# Patient Record
Sex: FEMALE | Race: WHITE | NOT HISPANIC OR LATINO | URBAN - METROPOLITAN AREA
[De-identification: names, ages, dates, MRNs, and addresses within clinical notes are randomized per-mention and may not be internally consistent; named-entity substitution may affect disease eponyms.]

---

## 2017-04-07 ENCOUNTER — EMERGENCY (EMERGENCY)
Facility: HOSPITAL | Age: 82
LOS: 1 days | Discharge: HOME | End: 2017-04-07
Attending: EMERGENCY MEDICINE | Admitting: EMERGENCY MEDICINE
Payer: MEDICARE

## 2017-04-07 VITALS
SYSTOLIC BLOOD PRESSURE: 189 MMHG | HEART RATE: 53 BPM | RESPIRATION RATE: 18 BRPM | TEMPERATURE: 98 F | OXYGEN SATURATION: 98 % | DIASTOLIC BLOOD PRESSURE: 91 MMHG

## 2017-04-07 PROBLEM — Z00.00 ENCOUNTER FOR PREVENTIVE HEALTH EXAMINATION: Status: ACTIVE | Noted: 2017-04-07

## 2017-04-07 PROCEDURE — 93010 ELECTROCARDIOGRAM REPORT: CPT

## 2017-04-07 PROCEDURE — 99284 EMERGENCY DEPT VISIT MOD MDM: CPT | Mod: 25

## 2017-04-07 RX ORDER — AMLODIPINE BESYLATE 2.5 MG/1
2.5 TABLET ORAL ONCE
Qty: 0 | Refills: 0 | Status: COMPLETED | OUTPATIENT
Start: 2017-04-07 | End: 2017-04-07

## 2017-04-07 RX ORDER — AMLODIPINE BESYLATE 2.5 MG/1
5 TABLET ORAL ONCE
Qty: 0 | Refills: 0 | Status: DISCONTINUED | OUTPATIENT
Start: 2017-04-07 | End: 2017-04-07

## 2017-04-07 RX ADMIN — AMLODIPINE BESYLATE 2.5 MILLIGRAM(S): 2.5 TABLET ORAL at 17:06

## 2017-04-07 NOTE — ED PROVIDER NOTE - ATTENDING CONTRIBUTION TO CARE
89f, pmhx paget's. was at pre surgical testing today and found to be htn at 200 SBP. was rx norvasc 2.5 andf was told to f/u with pmd or UC. went to  today who referred her to the ED. The patient denies any acute chest or back pain, shortness of breath or vomiting. The patient also denies weakness, numbness, parasthesias, visual changes or ataxia. The patient reports no decreased urination or abdominal pain.   the pt has a pmd and her appt was made for monday. however, she is not compliant with periodic physical exams and last had one 2 years ago. exam, bp 189/90, hr 53, other vs wnl. hs and lungs normal, abdo benign, CN II-XII normal, motor/sensation/tone normal, gait normal, drift neg, cerebellar signs normal. pt has asymptomatic htn. per pt request for treatment will give her the norvasc 2.5 that she was rx. rter for s/s htn emergency. had a long discussion on bp monitoring, the need to not lower bp acutely and the need for close f/u.

## 2017-04-07 NOTE — ED ADULT NURSE NOTE - OBJECTIVE STATEMENT
Patient coming in from urgent care. States was just recently prescribed amlodipine 2.5 and was not able to  the prescription yet. Went to urgent care and was told BP was high and to come to ER. Patient medicated as ordered. Vitally stable, in no acute distress.

## 2017-04-07 NOTE — ED PROVIDER NOTE - OBJECTIVE STATEMENT
85 yo F with history PAgets disease presenting with elevated blood pressure today to systolic of 200's. Pt has noc omplaints. Pt went for presurgical testing and found to have elevated bp. told to go to pmd or urgent care. PMD not available so went to urgent care. Denies fevers, chills, cough, rhinorrhea, otorrhea, otalgia, nausea, vomiting, constipation, diarrhea, chest pain, shortness of breath or changes in urinary habits. Pt states she went to PMD weeks ago and told bp normal but unaware how high.

## 2022-07-31 ENCOUNTER — INPATIENT (INPATIENT)
Facility: HOSPITAL | Age: 87
LOS: 3 days | Discharge: HOME | DRG: 436 | End: 2022-08-04
Attending: GENERAL PRACTICE | Admitting: GENERAL PRACTICE
Payer: MEDICARE

## 2022-07-31 VITALS
HEIGHT: 60 IN | TEMPERATURE: 98 F | RESPIRATION RATE: 20 BRPM | SYSTOLIC BLOOD PRESSURE: 218 MMHG | DIASTOLIC BLOOD PRESSURE: 88 MMHG | HEART RATE: 65 BPM | WEIGHT: 119.93 LBS | OXYGEN SATURATION: 99 %

## 2022-07-31 DIAGNOSIS — K86.89 OTHER SPECIFIED DISEASES OF PANCREAS: ICD-10-CM

## 2022-07-31 DIAGNOSIS — R60.0 LOCALIZED EDEMA: ICD-10-CM

## 2022-07-31 DIAGNOSIS — R00.1 BRADYCARDIA, UNSPECIFIED: ICD-10-CM

## 2022-07-31 DIAGNOSIS — K52.9 NONINFECTIVE GASTROENTERITIS AND COLITIS, UNSPECIFIED: ICD-10-CM

## 2022-07-31 LAB
ALBUMIN SERPL ELPH-MCNC: 4.5 G/DL — SIGNIFICANT CHANGE UP (ref 3.3–5)
ALP SERPL-CCNC: 114 U/L — SIGNIFICANT CHANGE UP (ref 40–120)
ALT FLD-CCNC: 11 U/L — SIGNIFICANT CHANGE UP (ref 10–45)
ANION GAP SERPL CALC-SCNC: 15 MMOL/L — SIGNIFICANT CHANGE UP (ref 5–17)
AST SERPL-CCNC: 33 U/L — SIGNIFICANT CHANGE UP (ref 10–40)
BASOPHILS # BLD AUTO: 0.02 K/UL — SIGNIFICANT CHANGE UP (ref 0–0.2)
BASOPHILS NFR BLD AUTO: 0.3 % — SIGNIFICANT CHANGE UP (ref 0–2)
BILIRUB SERPL-MCNC: 0.8 MG/DL — SIGNIFICANT CHANGE UP (ref 0.2–1.2)
BUN SERPL-MCNC: 12 MG/DL — SIGNIFICANT CHANGE UP (ref 7–23)
CALCIUM SERPL-MCNC: 9.6 MG/DL — SIGNIFICANT CHANGE UP (ref 8.4–10.5)
CHLORIDE SERPL-SCNC: 100 MMOL/L — SIGNIFICANT CHANGE UP (ref 96–108)
CO2 SERPL-SCNC: 23 MMOL/L — SIGNIFICANT CHANGE UP (ref 22–31)
CREAT SERPL-MCNC: 0.78 MG/DL — SIGNIFICANT CHANGE UP (ref 0.5–1.3)
EGFR: 73 ML/MIN/1.73M2 — SIGNIFICANT CHANGE UP
EOSINOPHIL # BLD AUTO: 0.04 K/UL — SIGNIFICANT CHANGE UP (ref 0–0.5)
EOSINOPHIL NFR BLD AUTO: 0.6 % — SIGNIFICANT CHANGE UP (ref 0–6)
GLUCOSE SERPL-MCNC: 108 MG/DL — HIGH (ref 70–99)
HCT VFR BLD CALC: 39.1 % — SIGNIFICANT CHANGE UP (ref 34.5–45)
HGB BLD-MCNC: 12.6 G/DL — SIGNIFICANT CHANGE UP (ref 11.5–15.5)
IMM GRANULOCYTES NFR BLD AUTO: 0.1 % — SIGNIFICANT CHANGE UP (ref 0–1.5)
LYMPHOCYTES # BLD AUTO: 1.65 K/UL — SIGNIFICANT CHANGE UP (ref 1–3.3)
LYMPHOCYTES # BLD AUTO: 23.1 % — SIGNIFICANT CHANGE UP (ref 13–44)
MCHC RBC-ENTMCNC: 31.8 PG — SIGNIFICANT CHANGE UP (ref 27–34)
MCHC RBC-ENTMCNC: 32.2 GM/DL — SIGNIFICANT CHANGE UP (ref 32–36)
MCV RBC AUTO: 98.7 FL — SIGNIFICANT CHANGE UP (ref 80–100)
MONOCYTES # BLD AUTO: 0.44 K/UL — SIGNIFICANT CHANGE UP (ref 0–0.9)
MONOCYTES NFR BLD AUTO: 6.2 % — SIGNIFICANT CHANGE UP (ref 2–14)
NEUTROPHILS # BLD AUTO: 4.97 K/UL — SIGNIFICANT CHANGE UP (ref 1.8–7.4)
NEUTROPHILS NFR BLD AUTO: 69.7 % — SIGNIFICANT CHANGE UP (ref 43–77)
NRBC # BLD: 0 /100 WBCS — SIGNIFICANT CHANGE UP (ref 0–0)
PLATELET # BLD AUTO: 158 K/UL — SIGNIFICANT CHANGE UP (ref 150–400)
POTASSIUM SERPL-MCNC: 5.3 MMOL/L — SIGNIFICANT CHANGE UP (ref 3.5–5.3)
POTASSIUM SERPL-SCNC: 5.3 MMOL/L — SIGNIFICANT CHANGE UP (ref 3.5–5.3)
PROT SERPL-MCNC: 8.2 G/DL — SIGNIFICANT CHANGE UP (ref 6–8.3)
RBC # BLD: 3.96 M/UL — SIGNIFICANT CHANGE UP (ref 3.8–5.2)
RBC # FLD: 13.3 % — SIGNIFICANT CHANGE UP (ref 10.3–14.5)
SARS-COV-2 RNA SPEC QL NAA+PROBE: SIGNIFICANT CHANGE UP
SODIUM SERPL-SCNC: 138 MMOL/L — SIGNIFICANT CHANGE UP (ref 135–145)
WBC # BLD: 7.13 K/UL — SIGNIFICANT CHANGE UP (ref 3.8–10.5)
WBC # FLD AUTO: 7.13 K/UL — SIGNIFICANT CHANGE UP (ref 3.8–10.5)

## 2022-07-31 PROCEDURE — 71250 CT THORAX DX C-: CPT | Mod: 26

## 2022-07-31 PROCEDURE — 74177 CT ABD & PELVIS W/CONTRAST: CPT | Mod: 26,MA

## 2022-07-31 PROCEDURE — 99285 EMERGENCY DEPT VISIT HI MDM: CPT

## 2022-07-31 RX ORDER — LANOLIN ALCOHOL/MO/W.PET/CERES
3 CREAM (GRAM) TOPICAL AT BEDTIME
Refills: 0 | Status: DISCONTINUED | OUTPATIENT
Start: 2022-07-31 | End: 2022-08-04

## 2022-07-31 RX ORDER — ONDANSETRON 8 MG/1
4 TABLET, FILM COATED ORAL EVERY 8 HOURS
Refills: 0 | Status: DISCONTINUED | OUTPATIENT
Start: 2022-07-31 | End: 2022-08-04

## 2022-07-31 RX ORDER — HEPARIN SODIUM 5000 [USP'U]/ML
5000 INJECTION INTRAVENOUS; SUBCUTANEOUS EVERY 12 HOURS
Refills: 0 | Status: DISCONTINUED | OUTPATIENT
Start: 2022-07-31 | End: 2022-08-01

## 2022-07-31 RX ORDER — SODIUM CHLORIDE 9 MG/ML
500 INJECTION INTRAMUSCULAR; INTRAVENOUS; SUBCUTANEOUS ONCE
Refills: 0 | Status: COMPLETED | OUTPATIENT
Start: 2022-07-31 | End: 2022-07-31

## 2022-07-31 RX ORDER — AMLODIPINE BESYLATE 2.5 MG/1
5 TABLET ORAL ONCE
Refills: 0 | Status: COMPLETED | OUTPATIENT
Start: 2022-07-31 | End: 2022-07-31

## 2022-07-31 RX ORDER — ACETAMINOPHEN 500 MG
650 TABLET ORAL EVERY 6 HOURS
Refills: 0 | Status: DISCONTINUED | OUTPATIENT
Start: 2022-07-31 | End: 2022-08-04

## 2022-07-31 RX ADMIN — SODIUM CHLORIDE 500 MILLILITER(S): 9 INJECTION INTRAMUSCULAR; INTRAVENOUS; SUBCUTANEOUS at 13:43

## 2022-07-31 RX ADMIN — AMLODIPINE BESYLATE 5 MILLIGRAM(S): 2.5 TABLET ORAL at 16:25

## 2022-07-31 NOTE — ED ADULT NURSE REASSESSMENT NOTE - NS ED NURSE REASSESS COMMENT FT1
Report received from EMERITA Guo. Patient resting in stretcher in red room 38L. A&Ox3. VSS. IV patent and flushes without difficulty. Denies any pain at this time.  at bedside, plan of care discussed with patient and family, verbalized understanding. Stretcher locked and in lowest position, appropriate side rails up. Pt instructed to notify RN if assistance is needed. Pending bed placement. Report received from EMERITA Guo. Patient resting in stretcher in red room 38L. A&Ox3. VSS. IV patent and flushes without difficulty. Denies any pain at this time. Family at bedside, plan of care discussed with patient and family, verbalized understanding. Stretcher locked and in lowest position, appropriate side rails up. Pt instructed to notify RN if assistance is needed. Pending bed placement.

## 2022-07-31 NOTE — H&P ADULT - PROBLEM SELECTOR PLAN 1
likely pancreatic cancer with mets  check cancer markers  CT as noted  check CT chest for complete workup  onc to arrange for biopsy if pt agreeable

## 2022-07-31 NOTE — ED PROVIDER NOTE - OBJECTIVE STATEMENT
89 year old female with a PMHx of anal cancer now resolved and no other stated medical history presenting with multiple medical problems. Patient states that she has had a bump under her left breast for the last 40 years. Over the last 2 weeks the lump has been growing. Painful at night. Associated diarrhea and loss of appetite over the last few days. Denies fevers, chest pain, difficulty breathing, abdominal pain, nausea, vomiting, changes in vision, headaches. Patient also has left lower extremity edema that she states has been present for the last 2 years and has been evaluated by her physician. She states she takes no medications.

## 2022-07-31 NOTE — ED PROVIDER NOTE - PHYSICAL EXAMINATION
gen: well appearing  Mentation: AAO x 3  psych: Anxious  HEENT: airway patent, conjunctivae clear bilaterally  Cardio: RRR, no m/r/g  Resp: normal BS b/l  GI: soft/nondistended/nontender  Neuro: sensation and motor function grossly intact  Skin: numerous seborrheic keratosis on abdomen    MSK: [id number] mobile non tender mass at the left lower ribs under the left breast  Lymph/Vasc: no LE edema gen: well appearing  Mentation: AAO x 3  psych: Anxious  HEENT: airway patent, conjunctivae clear bilaterally  Cardio: RRR, no m/r/g  Resp: normal BS b/l  GI: soft/nondistended/nontender  Neuro: sensation and motor function grossly intact  Skin: numerous seborrheic keratosis on abdomen    MSK: [id number] mobile non tender mass at the left lower ribs under the left breast  Lymph/Vasc: no LE edema  Attending Shirley Woods: Gen: NAD, heent: atrauamtic, eomi, perrla, mmm, op pink, uvula midline, neck; nttp,full rom chest: nttp, no crepitus, cv: rrr, no murmurs, lungs: ctab, abd: soft, nontender, nondistended, no peritoneal signs, aprpxoimatley 1cm mobile nontdender mass in left upper abdomen, no guarding, ext: wwp, mild left LE swelling, skin: no rash, neuro: awake and alert, following commands, speech clear, sensation and strength intact, no focal deficits

## 2022-07-31 NOTE — CONSULT NOTE ADULT - SUBJECTIVE AND OBJECTIVE BOX
Reason for Consultation:    Chief Complaint:  Patient is a 89y old  Female who presents with a chief complaint of weight loss (2022 20:36)      HPI: Asked to see patient for an abnormal CT scan noting a pancreatic tail mass and liver lesions suspected to be metastatic disease. She was admitted from home. she says that she had noted a bulge on her left abdomen, which was painful. She has a history of anal cancer and it was treated 4 years ago with chemo and RT. She says that she has been clear from disease. She no longer wants to travel to the city for her care.       Medications:  acetaminophen     Tablet .. 650 milliGRAM(s) Oral every 6 hours PRN  aluminum hydroxide/magnesium hydroxide/simethicone Suspension 30 milliLiter(s) Oral every 4 hours PRN  heparin   Injectable 5000 Unit(s) SubCutaneous every 12 hours  melatonin 3 milliGRAM(s) Oral at bedtime PRN  ondansetron Injectable 4 milliGRAM(s) IV Push every 8 hours PRN        Allergies:  No Known Allergies    FAMILY HISTORY:  son has stomach cancer. Brother  from prostate cancer    Social history: lives at home by herself. No tobacco, ETOH or IVDA    PAST MEDICAL & SURGICAL HISTORY:  Anal cancer    REVIEW OF SYSTEMS      General: decreased appetite and losing weight. No fevers or chills but has occasional night sweats. Some fatigue	    Skin/Breast: No rash, itch, bruising  	  Ophthalmologic: No blurry vision  	  ENMT:	No hearing loss, nosebleeds, sore throat    Respiratory and Thorax: No SOB, cough, wheeze, hemoptysis  	  Cardiovascular:	No CP    Gastrointestinal:	 No N/V/D, BRBPR    Genitourinary:	No dysuria or hematuria    Musculoskeletal:	 No back or leg pain. has leg swelling    Neurological: No HA, dizziness, numbness    Psychiatric: Some trouble sleeping    Hematology/Lymphatics:	    Vitals:  Vital Signs Last 24 Hrs  T(C): 36.6 (2022 19:20), Max: 36.7 (2022 11:12)  T(F): 97.8 (2022 19:20), Max: 98.1 (2022 11:12)  HR: 56 (2022 19:20) (54 - 70)  BP: 148/74 (2022 19:20) (148/74 - 228/79)  BP(mean): --  RR: 18 (2022 19:20) (18 - 20)  SpO2: 97% (2022 19:20) (97% - 99%)    Parameters below as of 2022 19:20  Patient On (Oxygen Delivery Method): room air        Pex:  alert NAD  EOMI anicteric sclera  Neck No LNA  Cv s1 S2 RRR  Lungs clear B/L  abd soft NT ND +BS  B/L LE edema no tenderness. L > R    Labs:                        12.6   7.13  )-----------( 158      ( 2022 13:46 )             39.1     CBC Full  -  ( 2022 13:46 )  WBC Count : 7.13 K/uL  RBC Count : 3.96 M/uL  Hemoglobin : 12.6 g/dL  Hematocrit : 39.1 %  Platelet Count - Automated : 158 K/uL  Mean Cell Volume : 98.7 fl  Mean Cell Hemoglobin : 31.8 pg  Mean Cell Hemoglobin Concentration : 32.2 gm/dL  Auto Neutrophil # : 4.97 K/uL  Auto Lymphocyte # : 1.65 K/uL  Auto Monocyte # : 0.44 K/uL  Auto Eosinophil # : 0.04 K/uL  Auto Basophil # : 0.02 K/uL  Auto Neutrophil % : 69.7 %  Auto Lymphocyte % : 23.1 %  Auto Monocyte % : 6.2 %  Auto Eosinophil % : 0.6 %  Auto Basophil % : 0.3 %        138  |  100  |  12  ----------------------------<  108<H>  5.3   |  23  |  0.78    Ca    9.6      2022 13:46    TPro  8.2  /  Alb  4.5  /  TBili  0.8  /  DBili  x   /  AST  33  /  ALT  11  /  AlkPhos  114        [id number]

## 2022-07-31 NOTE — ED ADULT TRIAGE NOTE - CHIEF COMPLAINT QUOTE
I have a growth under my left breast; has been growing for over 40 years now causing me to have diarrhea and no appetite; havent seen a doctor about

## 2022-07-31 NOTE — H&P ADULT - HISTORY OF PRESENT ILLNESS
>>>>>>Medical Attending Initial / Admission note<<<<<<  -------------------------------------------------------------------------------  CHIEF COMPLAINT & HISTORY OF PRESENT ILLNESS:      Patient is a 89yo Female with past medical history of     --------------------------------------------------------------------------------  PAST MEDICAL HISTORY:    PAST MEDICAL & SURGICAL HISTORY:  Anal cancer        --------------------------------------------------------------------------------  PAST SURGICAL HISTORY:      --------------------------------------------------------------------------------  FAMILY HISTORY:    FAMILY HISTORY:    --------------------------------------------------------------------------------  SOCIAL HISTORY:  Alcohol: None reported  Smoking: None reported     --------------------------------------------------------------------------------  ALLERGIES:    [As leticia, reviewed]    --------------------------------------------------------------------------------  MEDICATIONS:   [As bellow, reviewed]    --------------------------------------------------------------------------------  REVIEW OF SYSTEM:    GEN: no fever, no chills, no pain  RESP: no SOB, no cough, no sputum  CVS: no chest pain, no palpitations, no edema  GI: no abdominal pain, no nausea, no vomiting, no constipation, no diarrhea  : no dysuria, no frequency, no hematuria  Neuro: no headache, no dizziness  PSYCH: no anxiety, no depression  Derm : no itching, no rash    --------------------------------------------------------------------------------  VITAL SIGNS:  Height (cm): 152.4  Weight (kg): 54.4  BMI (kg/m2): 23.4  Vital Signs Last 24 HrsT(C): 36.6 (07-31-22 @ 19:20)  T(F): 97.8 (07-31-22 @ 19:20), Max: 98.1 (07-31-22 @ 11:12)  HR: 56 (07-31-22 @ 19:20) (54 - 70)  BP: 148/74 (07-31-22 @ 19:20)  RR: 18 (07-31-22 @ 19:20) (18 - 20)  SpO2: 97% (07-31-22 @ 19:20) (97% - 99%)      CAPILLARY BLOOD GLUCOSE          --------------------------------------------------------------------------------  PHYSICAL EXAM:    GEN: A&O X 3 , NAD , comfortable, pleasant, calm  HEENT: NCAT, PERRL, MMM, hearing intact  Neck: supple , no JVD  CVS: S1S2 , regular , No M/R/G appreciated  PULM: CTA B/L,  no W/R/R appreciated  ABD.: soft. non tender, non distended,  bowel sounds present  Extrem: intact pulses , no edema   Derm: No rash , no ecchymoses  PSYCH : normal mood,  no delusion not anxious    --------------------------------------------------------------------------------  LAB AND IMAGING:   [As leticia, reviewed]    --------------------------------------------------------------------------------  ASSESSMENT AND PLAN:   [As bellow]    --------------------  Case discussed with   ___________________________  LINA Lucero D.O.  Pager: [phone]  07-31-22   >>>>>>Medical Attending Initial / Admission note<<<<<<  -------------------------------------------------------------------------------  CHIEF COMPLAINT & HISTORY OF PRESENT ILLNESS:      Patient is a 89yo Female with past medical history of anal cancer in 2018 post Chemo and RT, chronic leg swelling, presenting with abdominal discomfort.   Patient states that she has had a lump under her left breast for the last 40 years. Over the last 2 weeks the lump has been growing. Painful / uncomfortable at night.   also with c/o of on and off diarrhea and loss of appetite over the last few days.   Denies fevers, chest pain, difficulty breathing, abdominal pain, nausea, vomiting, changes in weight.  Patient also has left lower extremity edema that she states has been present for the last 2 years and has been evaluated by her physician. She states she takes no medications  in ED pt found to have likely metastatic pancreatic cancer    --------------------------------------------------------------------------------  PAST MEDICAL HISTORY:    Anal cancer h/o post chemo and RT ( 2018)   chronic leg swelling     --------------------------------------------------------------------------------  PAST SURGICAL HISTORY:    none reported     --------------------------------------------------------------------------------  FAMILY HISTORY:    Brother: prostate cancer  Father: heart problem  Son: stomach and oral cancer     --------------------------------------------------------------------------------  SOCIAL HISTORY:  Alcohol: None reported  Smoking: None reported     --------------------------------------------------------------------------------  ALLERGIES:    [As leticia, reviewed]    --------------------------------------------------------------------------------  MEDICATIONS:   [As leticia, reviewed]    --------------------------------------------------------------------------------  REVIEW OF SYSTEM:    GEN: no fever, no chills, pain/ discomfort as above   RESP: no SOB, no cough, no sputum  CVS: no chest pain, no palpitations, no edema  GI: no abdominal pain, no nausea, no vomiting, diarrhea on and off for several days with poor appetite   : no dysuria, no frequency, no hematuria  Neuro: no headache, no dizziness  PSYCH: no anxiety, no depression  Derm : no itching, no rash    --------------------------------------------------------------------------------  VITAL SIGNS:  Height (cm): 152.4  Weight (kg): 54.4  BMI (kg/m2): 23.4  Vital Signs Last 24 HrsT(C): 36.6 (07-31-22 @ 19:20)  T(F): 97.8 (07-31-22 @ 19:20), Max: 98.1 (07-31-22 @ 11:12)  HR: 56 (07-31-22 @ 19:20) (54 - 70)  BP: 148/74 (07-31-22 @ 19:20)  RR: 18 (07-31-22 @ 19:20) (18 - 20)  SpO2: 97% (07-31-22 @ 19:20) (97% - 99%)      --------------------------------------------------------------------------------  PHYSICAL EXAM:    GEN: A&O X 3 , NAD , comfortable, pleasant, calm  HEENT: NCAT, PERRL, MMM, hearing intact  Neck: supple , no JVD  CVS: S1S2 , regular , No M/R/G appreciated  PULM: CTA B/L,  no W/R/R appreciated  ABD.: soft. non tender, non distended,  bowel sounds present      + a mass is palpated on left mid to upper abdomen   Extrem: intact pulses , no edema   Derm: No rash , no ecchymoses  PSYCH : normal mood,  no delusion not anxious    --------------------------------------------------------------------------------  LAB AND IMAGING:   [As leticia, reviewed]    --------------------------------------------------------------------------------  ASSESSMENT AND PLAN:   [As leticia]    --------------------  Case discussed with yajaira estrada, oncologist   ___________________________  H. RAMONITA Lucero.  Pager: [phone]  07-31-22   >>>>>>Medical Attending Initial / Admission note<<<<<<  -------------------------------------------------------------------------------  CHIEF COMPLAINT & HISTORY OF PRESENT ILLNESS:      Patient is a 88yo Female with past medical history of anal cancer in 2018 post Chemo and RT, chronic leg swelling, presenting with abdominal discomfort.   Patient states that she has had a lump under her left breast for the last 40 years. Over the last 2 weeks the lump has been growing. Painful / uncomfortable at night.   also with c/o of on and off diarrhea and loss of appetite over the last few days.   Denies fevers, chest pain, difficulty breathing, abdominal pain, nausea, vomiting, changes in weight.  Patient also has left lower extremity edema that she states has been present for the last 2 years and has been evaluated by her physician. She states she takes no medications  in ED pt found to have likely metastatic pancreatic cancer    --------------------------------------------------------------------------------  PAST MEDICAL HISTORY:    Anal cancer h/o post chemo and RT ( 2018)   chronic leg swelling     --------------------------------------------------------------------------------  PAST SURGICAL HISTORY:    none reported     --------------------------------------------------------------------------------  FAMILY HISTORY:    Brother: prostate cancer  Father: heart problem  Son: stomach and oral cancer     --------------------------------------------------------------------------------  SOCIAL HISTORY:  Alcohol: None reported  Smoking: None reported     --------------------------------------------------------------------------------  ALLERGIES:    [As leticia, reviewed]    --------------------------------------------------------------------------------  MEDICATIONS:   [As leticia, reviewed]    --------------------------------------------------------------------------------  REVIEW OF SYSTEM:    GEN: no fever, no chills, pain/ discomfort as above   RESP: no SOB, no cough, no sputum  CVS: no chest pain, no palpitations, no edema  GI: no abdominal pain, no nausea, no vomiting, diarrhea on and off for several days with poor appetite   : no dysuria, no frequency, no hematuria  Neuro: no headache, no dizziness  PSYCH: no anxiety, no depression  Derm : no itching, no rash    --------------------------------------------------------------------------------  VITAL SIGNS:  Height (cm): 152.4  Weight (kg): 54.4  BMI (kg/m2): 23.4  Vital Signs Last 24 HrsT(C): 36.6 (07-31-22 @ 19:20)  T(F): 97.8 (07-31-22 @ 19:20), Max: 98.1 (07-31-22 @ 11:12)  HR: 56 (07-31-22 @ 19:20) (54 - 70)  BP: 148/74 (07-31-22 @ 19:20)  RR: 18 (07-31-22 @ 19:20) (18 - 20)  SpO2: 97% (07-31-22 @ 19:20) (97% - 99%)      --------------------------------------------------------------------------------  PHYSICAL EXAM:    GEN: A&O X 3 , NAD , comfortable, pleasant, calm  HEENT: NCAT, PERRL, MMM, hearing intact  Neck: supple , no JVD  CVS: S1S2 , regular , No M/R/G appreciated  PULM: CTA B/L,  no W/R/R appreciated  ABD.: soft. non tender, non distended,  bowel sounds present      + a mass is palpated on left mid to upper abdomen   Extrem: intact pulses , no edema   Derm: No rash , no ecchymoses  PSYCH : normal mood,  no delusion not anxious    --------------------------------------------------------------------------------  LAB AND IMAGING:   [As leticia, reviewed]    --------------------------------------------------------------------------------  ASSESSMENT AND PLAN:   [As leticia]    --------------------  Case discussed with yajaira estrada, oncologist   ___________________________  H. RAMONITA Lucero.  Pager: [phone]  07-31-22

## 2022-07-31 NOTE — ED PROVIDER NOTE - ATTENDING CONTRIBUTION TO CARE
Attending MD Shirley Woods:  I personally have seen and examined this patient.  Resident note reviewed and agree on plan of care and except where noted.  See HPI, PE, and MDM for details.

## 2022-07-31 NOTE — H&P ADULT - CONVERSATION DETAILS
*** Advance directive /  goals of care discussion      I had a long discussion with patient ( and family) about patient's overall diagnosis, expected prognosis, and potential complications.       Discussed treatment options, comfort care / hospice as appropriate, and all other potential options of care.         Discussed risks, benefits, and alternatives of treatment as well.          Opportunity given for and all questions answered.            Reviewed available advance directives as available > none: pt's son at bedside would be main decision maker as needed : to discuss filling out proxy papers      At this time patient to be > full code, with continuation of current medical therapy.     Goal is for pt to return > home and follow up as outpatient with current doctors      will continue to discuss GOC with pt and family and update plan as needed.     Patient's family have my contact information and will contact me with questions.     Additional time spent on Goals of care: 22 min.

## 2022-07-31 NOTE — CONSULT NOTE ADULT - SUBJECTIVE AND OBJECTIVE BOX
CHIEF COMPLAINT:Patient is a 89y old  Female who presents with a chief complaint of     HPI:  89 year old female with a PMHx of anal cancer now resolved and no other stated medical history presenting with multiple medical problems. Patient states that she has had a bump under her left breast for the last 40 years. Over the last 2 weeks the lump has been growing. Painful at night. Associated diarrhea and loss of appetite over the last few days. Denies fevers, chest pain, difficulty breathing, abdominal pain, nausea, vomiting, changes in vision, headaches. Patient also has left lower extremity edema that she states has been present for the last 2 years and has been evaluated by her physician. She states she takes no medications.    PAST MEDICAL & SURGICAL HISTORY:  Anal cancer          MEDICATIONS  (STANDING):    MEDICATIONS  (PRN):      FAMILY HISTORY:      SOCIAL HISTORY:    [ x] Non-smoker  [ ] Smoker  [ ] Alcohol    Allergies    No Known Allergies    Intolerances    	    REVIEW OF SYSTEMS:  CONSTITUTIONAL: No fever, weight loss, or fatigue  EYES: No eye pain, visual disturbances, or discharge  ENT:  No difficulty hearing, tinnitus, vertigo; No sinus or throat pain  NECK: No pain or stiffness  RESPIRATORY: No cough, wheezing, chills or hemoptysis; No Shortness of Breath  CARDIOVASCULAR: No chest pain, palpitations, passing out, dizziness, or leg swelling  GASTROINTESTINAL: No abdominal or epigastric pain. No nausea, vomiting, or hematemesis; No diarrhea or constipation. No melena or hematochezia.  GENITOURINARY: No dysuria, frequency, hematuria, or incontinence  NEUROLOGICAL: No headaches, memory loss, loss of strength, numbness, or tremors  SKIN: No itching, burning, rashes, or lesions   LYMPH Nodes: No enlarged glands  ENDOCRINE: No heat or cold intolerance; No hair loss  MUSCULOSKELETAL: No joint pain or swelling; No muscle, back, or extremity pain  PSYCHIATRIC: No depression, anxiety, mood swings, or difficulty sleeping  HEME/LYMPH: No easy bruising, or bleeding gums  ALLERGY AND IMMUNOLOGIC: No hives or eczema	    [ ] All others negative	  [x ] Unable to obtain    PHYSICAL EXAM:  T(C): 36.6 (07-31-22 @ 19:20), Max: 36.7 (07-31-22 @ 11:12)  HR: 56 (07-31-22 @ 19:20) (54 - 70)  BP: 148/74 (07-31-22 @ 19:20) (148/74 - 228/79)  RR: 18 (07-31-22 @ 19:20) (18 - 20)  SpO2: 97% (07-31-22 @ 19:20) (97% - 99%)  Wt(kg): --  I&O's Summary      Appearance: Normal	  HEENT:   Normal oral mucosa, PERRL, EOMI	  Lymphatic: No lymphadenopathy  Cardiovascular: Normal S1 S2, No JVD, No murmurs, No edema  Respiratory: Lungs clear to auscultation	  Gastrointestinal:  Soft, Non-tender, + BS	  Skin: No rashes, No ecchymoses, No cyanosis	  Neurologic: Non-focal  Extremities: Normal range of motion, No clubbing, cyanosis or edema  Vascular: Peripheral pulses palpable 2+ bilaterally    TELEMETRY: 	    ECG:  	  RADIOLOGY:  OTHER: 	  	  LABS:	 	    CARDIAC MARKERS:                              12.6   7.13  )-----------( 158      ( 31 Jul 2022 13:46 )             39.1     07-31    138  |  100  |  12  ----------------------------<  108<H>  5.3   |  23  |  0.78    Ca    9.6      31 Jul 2022 13:46    TPro  8.2  /  Alb  4.5  /  TBili  0.8  /  DBili  x   /  AST  33  /  ALT  11  /  AlkPhos  114  07-31    proBNP: Serum Pro-Brain Natriuretic Peptide: 247 pg/mL (07-31 @ 13:46)    Lipid Profile:   HgA1c:   TSH:       PREVIOUS DIAGNOSTIC TESTING:      < from: CT Abdomen and Pelvis w/ IV Cont (07.31.22 @ 14:52) >  LIVER: A hypodense lesion in hepatic segment IVb (2:38) 2.5 x 2.1 cm)   concerning for metastasis. A 1.1 x 0.9 cm right hepatic lobe lesion   (2:19) is indeterminate. Additional hepatic cysts and subcentimeter foci   too small to characterize.  BILE DUCTS: Normal caliber.  GALLBLADDER: Cholelithiasis.  SPLEEN: Within normal limits.  PANCREAS: Infiltrative mass in the pancreatic tail (2:26) 4.9 x 4.5 cm.   Less than 180 degrees involvement of the SMV.. Evaluation for arterial   involvement is limited at single phase imaging.  ADRENALS: Within normal limits.  KIDNEYS/URETERS: Within normal limits.    BLADDER: Within normal limits.  REPRODUCTIVE ORGANS: Uterus and adnexa within normal limits.    BOWEL:No bowel obstruction. Colonic diverticulosis.  PERITONEUM: No ascites. A 1.5 cm cystic focus in the anterior left lower   quadrant, etiology unclear. This may be a benign cyst. Less likely this   represents peritoneal disease.  VESSELS: Atherosclerotic changes. Vascular involvement by tumor as above.   Occlusion of the splenic vein.  RETROPERITONEUM/LYMPH NODES: No lymphadenopathy.  ABDOMINAL WALL: Fat-containing right inguinal hernia.  BONES: Degenerative changes.    IMPRESSION:  Pancreatic tailmass with vascular involvement and suspected hepatic   metastatic disease.    A 1.5 cm cystic focus in the anterior left lower quadrants, etiology   unclear. This may be a benign cyst, less likely peritoneal disease.

## 2022-07-31 NOTE — H&P ADULT - PROBLEM SELECTOR PLAN 3
unclear etiology of diarrhea  not appear to be significant based on history  resume diet   encourage PO intake  monitor for now

## 2022-07-31 NOTE — CONSULT NOTE ADULT - ASSESSMENT
89 year old female with a PMHx of anal cancer now resolved and no other stated medical history presenting with multiple medical problems. Patient states that she has had a bump under her left breast for the last 40 years. Over the last 2 weeks the lump has been growing. Painful at night. Associated diarrhea and loss of appetite over the last few days. Denies fevers, chest pain, difficulty breathing, abdominal pain, nausea, vomiting, changes in vision, headaches. Patient also has left lower extremity edema that she states has been present for the last 2 years and has been evaluated by her physician. She states she takes no medications.  ct scan abdomen and pelvis noted with metastatic pancreatic CA  no beta blocker sec to bradycardia  suspect possible dvt, check venous doppler  palliative care  no need for any cardiac testing  add Enalapril if increase bp  dvt prophylaxis

## 2022-07-31 NOTE — ED ADULT NURSE NOTE - OBJECTIVE STATEMENT
89 Y F PMHX of anal CA presents to the ED with a growth under my left breast; has been growing for over 40 years now causing me to have diarrhea, dizziness and no appetite,. reports that 40 years ago she saw a doctor about it and it wasn't bothering her so she left it alone, but it has been worsening over the last two weeks. Reports that she is having pain mainly at night. Reports that she was nauseous this morning. Denies CP, vision changes and abdominal pain. reports that when she lays down she is having worse SOB. reports that she has chronic LLE swelling, that her MD was giving her a water pill and told her to stop taking it because it wasn't working. On assessment, A&Ox4. PERRL 3 mm. WALL. no facial droop, slurred speech, and arm drift. strength equal in all extremities. Sensation normal in all extremities. Denies lightheadedness, numbness and tingling. Breathing spontaneously and unlabored on Room air. Denies cough and CP. No Peripheral edema. Peripheral pulses strong and equal bilaterally. Denies CP, and palpitations. Abdomen soft, nontender, nondistended. Pt is continent. Denies dysuria, melena and hematuria. IV placed 18g in R forearm. Labs drawn and sent. Pt safety maintained. Call bell within reach. Side rails in upward position. Pt awaiting dispo.

## 2022-07-31 NOTE — H&P ADULT - ASSESSMENT
Patient is a 88yo Female with past medical history of anal cancer in 2018 post Chemo and RT, chronic leg swelling, presenting with abdominal discomfort.   Patient states that she has had a lump under her left breast for the last 40 years. Over the last 2 weeks the lump has been growing. Painful / uncomfortable at night.   also with c/o of on and off diarrhea and loss of appetite over the last few days.   Denies fevers, chest pain, difficulty breathing, abdominal pain, nausea, vomiting, changes in weight.  Patient also has left lower extremity edema that she states has been present for the last 2 years and has been evaluated by her physician. She states she takes no medications  in ED pt found to have likely metastatic pancreatic cancer

## 2022-07-31 NOTE — ED ADULT NURSE NOTE - NSSUHOSCREENINGYN_ED_ALL_ED
Yes - the patient is able to be screened Olumiant Pregnancy And Lactation Text: Based on animal studies, Olumiant may cause embryo-fetal harm when administered to pregnant women.  The medication should not be used in pregnancy.  Breastfeeding is not recommended during treatment.

## 2022-07-31 NOTE — ED PROVIDER NOTE - CLINICAL SUMMARY MEDICAL DECISION MAKING FREE TEXT BOX
89 year old female with a stated PMHx of anal cancer presenting with mass under left breast at the left lower ribs, 2 episodes of diarrhea per day for the last few days, and decreased appetite. Denies fevers, sob, chest pain, nausea, vomiting, abdominal pain, vision changes. Patient states her blood pressure increases when she sees doctors. Patient appears anxious. Labs, IV fluids, CT abdomen/pelvis. 89 year old female with a stated PMHx of anal cancer presenting with mass under left breast at the left lower ribs, 2 episodes of diarrhea per day for the last few days, and decreased appetite. Denies fevers, sob, chest pain, nausea, vomiting, abdominal pain, vision changes. Patient states her blood pressure increases when she sees doctors. Patient appears anxious. Labs, IV fluids, CT abdomen/pelvis.  Attending Shirley Woods: 89 yo female presenting with concern for raised nontder mass to mass LUQ as well as diarrhea, decreased appetite and not feeling well. upon arrival pt hemodynamically stable and nontoxic appearing. with constellation of symptoms ct performed to further evaluate which showed evidence of pancreatic mass. pt also with left leg swelling. no calf ttp, and distal pulses intact. pt states swelling for many months. consider duplex to further evaluate. will admit for further eval, oncology

## 2022-07-31 NOTE — CONSULT NOTE ADULT - ASSESSMENT
patient is an 89-year old female admitted with abdominal pain and weight loss. CT A/P notes a pancreatic mass and liver lesions concerning for pancreatic cancer with liver metastases. She is elderly, but cares for herself. She had anal cancer in the past and that was treated with chemo/RT. Labs are mostly unremarkable. Discussed with hospital attending. Would get a CT chest and have IR evaluate for a CT guided liver biopsy. Ultimately, further oncology plan once pathology is obtained.      B/L LE edema left > right. Would get dopplers to assess for DVT.

## 2022-08-01 LAB
ALBUMIN SERPL ELPH-MCNC: 4.1 G/DL — SIGNIFICANT CHANGE UP (ref 3.3–5)
ALP SERPL-CCNC: 107 U/L — SIGNIFICANT CHANGE UP (ref 40–120)
ALT FLD-CCNC: 8 U/L — LOW (ref 10–45)
ANION GAP SERPL CALC-SCNC: 11 MMOL/L — SIGNIFICANT CHANGE UP (ref 5–17)
AST SERPL-CCNC: 16 U/L — SIGNIFICANT CHANGE UP (ref 10–40)
BASOPHILS # BLD AUTO: 0.02 K/UL — SIGNIFICANT CHANGE UP (ref 0–0.2)
BASOPHILS NFR BLD AUTO: 0.3 % — SIGNIFICANT CHANGE UP (ref 0–2)
BILIRUB SERPL-MCNC: 0.9 MG/DL — SIGNIFICANT CHANGE UP (ref 0.2–1.2)
BUN SERPL-MCNC: 12 MG/DL — SIGNIFICANT CHANGE UP (ref 7–23)
CALCIUM SERPL-MCNC: 9.9 MG/DL — SIGNIFICANT CHANGE UP (ref 8.4–10.5)
CANCER AG19-9 SERPL-ACNC: <2 U/ML — SIGNIFICANT CHANGE UP
CEA SERPL-MCNC: 15.3 NG/ML — HIGH (ref 0–3.8)
CHLORIDE SERPL-SCNC: 104 MMOL/L — SIGNIFICANT CHANGE UP (ref 96–108)
CHOLEST SERPL-MCNC: 216 MG/DL — HIGH
CO2 SERPL-SCNC: 25 MMOL/L — SIGNIFICANT CHANGE UP (ref 22–31)
CREAT SERPL-MCNC: 0.81 MG/DL — SIGNIFICANT CHANGE UP (ref 0.5–1.3)
EGFR: 69 ML/MIN/1.73M2 — SIGNIFICANT CHANGE UP
EOSINOPHIL # BLD AUTO: 0.11 K/UL — SIGNIFICANT CHANGE UP (ref 0–0.5)
EOSINOPHIL NFR BLD AUTO: 1.7 % — SIGNIFICANT CHANGE UP (ref 0–6)
GLUCOSE SERPL-MCNC: 114 MG/DL — HIGH (ref 70–99)
HCT VFR BLD CALC: 37.2 % — SIGNIFICANT CHANGE UP (ref 34.5–45)
HDLC SERPL-MCNC: 76 MG/DL — SIGNIFICANT CHANGE UP
HGB BLD-MCNC: 12.3 G/DL — SIGNIFICANT CHANGE UP (ref 11.5–15.5)
IMM GRANULOCYTES NFR BLD AUTO: 0.3 % — SIGNIFICANT CHANGE UP (ref 0–1.5)
LIPID PNL WITH DIRECT LDL SERPL: 127 MG/DL — HIGH
LYMPHOCYTES # BLD AUTO: 1.53 K/UL — SIGNIFICANT CHANGE UP (ref 1–3.3)
LYMPHOCYTES # BLD AUTO: 23.8 % — SIGNIFICANT CHANGE UP (ref 13–44)
MCHC RBC-ENTMCNC: 31.9 PG — SIGNIFICANT CHANGE UP (ref 27–34)
MCHC RBC-ENTMCNC: 33.1 GM/DL — SIGNIFICANT CHANGE UP (ref 32–36)
MCV RBC AUTO: 96.6 FL — SIGNIFICANT CHANGE UP (ref 80–100)
MONOCYTES # BLD AUTO: 0.61 K/UL — SIGNIFICANT CHANGE UP (ref 0–0.9)
MONOCYTES NFR BLD AUTO: 9.5 % — SIGNIFICANT CHANGE UP (ref 2–14)
NEUTROPHILS # BLD AUTO: 4.15 K/UL — SIGNIFICANT CHANGE UP (ref 1.8–7.4)
NEUTROPHILS NFR BLD AUTO: 64.4 % — SIGNIFICANT CHANGE UP (ref 43–77)
NON HDL CHOLESTEROL: 139 MG/DL — HIGH
NRBC # BLD: 0 /100 WBCS — SIGNIFICANT CHANGE UP (ref 0–0)
PLATELET # BLD AUTO: 171 K/UL — SIGNIFICANT CHANGE UP (ref 150–400)
POTASSIUM SERPL-MCNC: 3.7 MMOL/L — SIGNIFICANT CHANGE UP (ref 3.5–5.3)
POTASSIUM SERPL-SCNC: 3.7 MMOL/L — SIGNIFICANT CHANGE UP (ref 3.5–5.3)
PROT SERPL-MCNC: 7.3 G/DL — SIGNIFICANT CHANGE UP (ref 6–8.3)
RBC # BLD: 3.85 M/UL — SIGNIFICANT CHANGE UP (ref 3.8–5.2)
RBC # FLD: 13.2 % — SIGNIFICANT CHANGE UP (ref 10.3–14.5)
SODIUM SERPL-SCNC: 140 MMOL/L — SIGNIFICANT CHANGE UP (ref 135–145)
TRIGL SERPL-MCNC: 63 MG/DL — SIGNIFICANT CHANGE UP
WBC # BLD: 6.44 K/UL — SIGNIFICANT CHANGE UP (ref 3.8–10.5)
WBC # FLD AUTO: 6.44 K/UL — SIGNIFICANT CHANGE UP (ref 3.8–10.5)

## 2022-08-01 PROCEDURE — 93970 EXTREMITY STUDY: CPT | Mod: 26

## 2022-08-01 PROCEDURE — 99222 1ST HOSP IP/OBS MODERATE 55: CPT | Mod: GC

## 2022-08-01 RX ADMIN — HEPARIN SODIUM 5000 UNIT(S): 5000 INJECTION INTRAVENOUS; SUBCUTANEOUS at 18:21

## 2022-08-01 RX ADMIN — HEPARIN SODIUM 5000 UNIT(S): 5000 INJECTION INTRAVENOUS; SUBCUTANEOUS at 05:24

## 2022-08-01 RX ADMIN — Medication 5 MILLIGRAM(S): at 09:45

## 2022-08-01 NOTE — CONSULT NOTE ADULT - ATTENDING COMMENTS
.. I have personally seen and examined the patient.  I fully participated in the care of this patient.  I have made amendments to the documentation where necessary, and agree with the history, physical exam, and plan as documented by Dr. Reardon.    89 F hx of anal cancer s/p chemo/RT (2018) p/w abd pain, weight loss, LE swelling. Cross-sectional imaging showed a new pancreatic mass with concern for liver metastasis. GI consulted for tissue sampling.    Recommendation:  - NPO @ [facility] for EUS-FNA/B tomorrow  - Diet as tolerated today  - Check Coags    Berna Arora MD  [facility] I have personally seen and examined the patient.  I fully participated in the care of this patient.  I have made amendments to the documentation where necessary, and agree with the history, physical exam, and plan as documented by Dr. Reardon.    89 F hx of anal cancer s/p chemo/RT (2018) p/w abd pain, weight loss, LE swelling. Cross-sectional imaging showed a new pancreatic mass with concern for liver metastasis. GI consulted for tissue sampling.    Recommendation:  - NPO @ [facility] for EUS-FNA/B tomorrow  - Diet as tolerated today  - Check Coags, CBC and CMP at 3AM.    Berna Arora MD  [facility] 89 F hx of anal cancer s/p chemo/RT (2018) p/w abd pain, weight loss, LE swelling. Cross-sectional imaging showed a new pancreatic mass with concern for liver metastasis. GI consulted for tissue sampling.    Recommendation:  - NPO @ [facility] for EUS-FNA/B tomorrow  - Diet as tolerated today  - Check Coags, CBC and CMP at 3AM.    Berna Arora MD  [facility] / [facility]

## 2022-08-01 NOTE — PROGRESS NOTE ADULT - ASSESSMENT
_________________________________________________________________________________________  ========>>  M E D I C A L   A T T E N D I N G    F O L L O W  U P  N O T E  <<=========  -----------------------------------------------------------------------------------------------------    - Patient evaluated by me, chart reviewed.   - In summary,  NIKI DOMINGO is a 89y year old woman admitted with mass   - Patient today overall doing ok, comfortable, eating OK.      ==================>> REVIEW OF SYSTEM <<=================    GEN: no fever, no chills, no pain  RESP: no SOB, no cough, no sputum  CVS: no chest pain, no palpitations, no edema  GI: no abdominal pain, no nausea,  some diarrhea  : no dysuria, no frequency, no hematuria  Neuro: no headache, no dizziness  Derm : no itching, no rash    ==================>> PHYSICAL EXAM <<=================    GEN: A&O X 3 , NAD , comfortable, pleasant, calm , cachectic   HEENT: NCAT, PERRL, MMM, hearing intact  Neck: supple , no JVD appreciated  CVS: S1S2 , regular , No M/R/G appreciated  PULM: CTA B/L,  no W/R/R appreciated  ABD.: soft. non tender, non distended,  bowel sounds present  Extrem: intact pulses , no edema   PSYCH : normal mood,  not anxious                            ( Note Written / Date of service :  08-01-22 )    ==================>> MEDICATIONS <<====================    MEDICATIONS  (STANDING):  enalapril 5 milliGRAM(s) Oral daily  heparin   Injectable 5000 Unit(s) SubCutaneous every 12 hours    MEDICATIONS  (PRN):  acetaminophen     Tablet .. 650 milliGRAM(s) Oral every 6 hours PRN Temp greater or equal to 38C (100.4F), Mild Pain (1 - 3)  aluminum hydroxide/magnesium hydroxide/simethicone Suspension 30 milliLiter(s) Oral every 4 hours PRN Dyspepsia  melatonin 3 milliGRAM(s) Oral at bedtime PRN Insomnia  ondansetron Injectable 4 milliGRAM(s) IV Push every 8 hours PRN Nausea and/or Vomiting    ___________  Active diet:  Diet, NPO after Midnight:      NPO Start Date: 01-Aug-2022,   NPO Start Time: 23:59  Diet, Regular  ___________________    ==================>> VITAL SIGNS <<==================    T(C): 36.4 (08-01-22 @ 11:52), Max: 37 (08-01-22 @ 04:20)  HR: 61 (08-01-22 @ 11:52) (56 - 69)  BP: 143/85 (08-01-22 @ 11:52) (125/76 - 177/76)  RR: 18 (08-01-22 @ 11:52) (16 - 18)  SpO2: 98% (08-01-22 @ 11:52) (96% - 99%)      ==================>> LAB AND IMAGING <<==================                        12.3   6.44  )-----------( 171      ( 01 Aug 2022 07:31 )             37.2        08-01    140  |  104  |  12  ----------------------------<  114<H>  3.7   |  25  |  0.81    Ca    9.9      01 Aug 2022 07:31    TPro  7.3  /  Alb  4.1  /  TBili  0.9  /  DBili  x   /  AST  16  /  ALT  8<L>  /  AlkPhos  107  08-01    Lipid profile:  (08-01-22)     Total: 216     LDL  : (p)     HDL  :76     TG   :63     < from: CT Abdomen and Pelvis w/ IV Cont (07.31.22 @ 14:52) >  IMPRESSION:  Pancreatic tail mass with vascular involvement and suspected hepatic  metastatic disease.  A 1.5 cm cystic focus in the anterior left lower quadrants, etiology   unclear. This may be a benign cyst, less likely peritoneal disease.  < end of copied text >    < from: CT Chest No Cont (07.31.22 @ 23:21) >  IMPRESSION:  Bilateral sub-3 mm nodules.  < end of copied text >    < from: [facility] Duplex Lower Ext Vein Scan, Bilat (08.01.22 @ 15:39) >  IMPRESSION:  No evidence of deep venous thrombosis in either lower extremity.  < end of copied text >    ___________________________________________________________________________________  ===============>>  A S S E S S M E N T   A N D   P L A N <<===============  ------------------------------------------------------------------------------------------    · Assessment	  Patient is a 88yo Female with past medical history of anal cancer in 2018 post Chemo and RT, chronic leg swelling, presenting with abdominal discomfort.   Patient states that she has had a lump under her left breast for the last 40 years. Over the last 2 weeks the lump has been growing. Painful / uncomfortable at night.   also with c/o of on and off diarrhea and loss of appetite over the last few days.   Denies fevers, chest pain, difficulty breathing, abdominal pain, nausea, vomiting, changes in weight.  Patient also has left lower extremity edema that she states has been present for the last 2 years and has been evaluated by her physician. She states she takes no medications  in ED pt found to have likely metastatic pancreatic cancer      Problem/Plan - 1:  ·  Problem: Pancreatic mass.   ·  Plan: likely pancreatic cancer with mets  CEA elevated , CA 19-9 negative !!!   CT as noted  CT chest and duplex as above   onc appreciated  IR noted > GI consult  Gi appreciated >> for EUS / Bx tomorrow   NPO post MN     Problem/Plan - 2:  ·  Problem: Bradycardia.   ·  Plan: monitor for now avoid AV deb blocker  cardio appreciated  nutrition   monitor.    Problem/Plan - 3:  ·  Problem: Non-infective diarrhea.   ·  Plan: unclear etiology of diarrhea  not appear to be significant based on history  diet as tolerated   encourage PO intake  monitor for now.    Problem/Plan - 4:  ·  Problem: Leg edema.   ·  Plan: chronic  Duplex negative for DVT.  protein intake  leg elevation     -GI/DVT Prophylaxis per protocol.    --------------------------------------------  Case discussed with pt's Dtr in law at bedside, NP, Onc   Education given on findings and plan of care  ___________________________  H. RAMONITA Lucero.  Pager: [phone]

## 2022-08-01 NOTE — CONSULT NOTE ADULT - ASSESSMENT
Interventional Radiology    Evaluate for Procedure: CT-guided liver lesion biopsy    HPI: 89y Female with PMH of anal cancer (s/p tx with chemo/RT) presented for weight loss and found to have pancreatic tail mass and liver lesions. IR was consulted for CT-guided liver lesion biopsy.    Allergies:   Medications (Abx/Cardiac/Anticoagulation/Blood Products)  amLODIPine   Tablet: 5 milliGRAM(s) Oral (07-31 @ 16:25)  enalapril: 5 milliGRAM(s) Oral (08-01 @ 09:45)  heparin   Injectable: 5000 Unit(s) SubCutaneous (08-01 @ 05:24)    Data:    T(C): 37  HR: 69  BP: 177/76  RR: 16  SpO2: 99%    -WBC 6.44 / HgB 12.3 / Hct 37.2 / Plt 171  -Na 140 / Cl 104 / BUN 12 / Glucose 114  -K 3.7 / CO2 25 / Cr 0.81  -ALT 8 / Alk Phos 107 / T.Bili 0.9      Radiology:   CT abd/pelvis reviewed    Assessment/Plan:   89y Female with PMH of anal cancer (s/p tx with chemo/RT) presented for weight loss and found to have pancreatic tail mass and liver lesions. IR was consulted for CT-guided liver lesion biopsy.  -- please obtain records and prior imaging    Interventional Radiology    Evaluate for Procedure: CT-guided liver lesion biopsy    HPI: 89y Female with PMH of anal cancer (s/p tx with chemo/RT) presented for weight loss and found to have pancreatic tail mass and liver lesions. IR was consulted for CT-guided liver lesion biopsy.    Allergies:   Medications (Abx/Cardiac/Anticoagulation/Blood Products)  amLODIPine   Tablet: 5 milliGRAM(s) Oral (07-31 @ 16:25)  enalapril: 5 milliGRAM(s) Oral (08-01 @ 09:45)  heparin   Injectable: 5000 Unit(s) SubCutaneous (08-01 @ 05:24)    Data:    T(C): 37  HR: 69  BP: 177/76  RR: 16  SpO2: 99%    -WBC 6.44 / HgB 12.3 / Hct 37.2 / Plt 171  -Na 140 / Cl 104 / BUN 12 / Glucose 114  -K 3.7 / CO2 25 / Cr 0.81  -ALT 8 / Alk Phos 107 / T.Bili 0.9      Radiology:   CT abd/pelvis reviewed    Assessment/Plan:   89y Female with PMH of anal cancer (s/p tx with chemo/RT) presented for weight loss and found to have pancreatic tail mass and liver lesions. IR was consulted for CT-guided liver lesion biopsy.  -- please obtain outside records and prior imaging   -- recommend GI consult for biopsyof pancreatic mass  -- please reconsult IR once these were obtained  -- case discussed with Dr. Verdin

## 2022-08-01 NOTE — PROGRESS NOTE ADULT - SUBJECTIVE AND OBJECTIVE BOX
CARDIOLOGY     PROGRESS  NOTE   ________________________________________________    CHIEF COMPLAINT:Patient is a 89y old  Female who presents with a chief complaint of weight loss (31 Jul 2022 20:36)  no complain.  	  REVIEW OF SYSTEMS:  CONSTITUTIONAL: No fever, weight loss, or fatigue  EYES: No eye pain, visual disturbances, or discharge  ENT:  No difficulty hearing, tinnitus, vertigo; No sinus or throat pain  NECK: No pain or stiffness  RESPIRATORY: No cough, wheezing, chills or hemoptysis; No Shortness of Breath  CARDIOVASCULAR: No chest pain, palpitations, passing out, dizziness, or leg swelling  GASTROINTESTINAL: No abdominal or epigastric pain. No nausea, vomiting, or hematemesis; No diarrhea or constipation. No melena or hematochezia.  GENITOURINARY: No dysuria, frequency, hematuria, or incontinence  NEUROLOGICAL: No headaches, memory loss, loss of strength, numbness, or tremors  SKIN: No itching, burning, rashes, or lesions   LYMPH Nodes: No enlarged glands  ENDOCRINE: No heat or cold intolerance; No hair loss  MUSCULOSKELETAL: No joint pain or swelling; No muscle, back, or extremity pain  PSYCHIATRIC: No depression, anxiety, mood swings, or difficulty sleeping  HEME/LYMPH: No easy bruising, or bleeding gums  ALLERGY AND IMMUNOLOGIC: No hives or eczema	    [ ] All others negative	  [ ] Unable to obtain    PHYSICAL EXAM:  T(C): 37 (08-01-22 @ 04:20), Max: 37 (08-01-22 @ 04:20)  HR: 66 (08-01-22 @ 04:20) (54 - 70)  BP: 167/65 (08-01-22 @ 04:20) (148/74 - 228/79)  RR: 18 (08-01-22 @ 04:20) (18 - 20)  SpO2: 99% (08-01-22 @ 04:20) (96% - 99%)  Wt(kg): --  I&O's Summary      Appearance: Normal	  HEENT:   Normal oral mucosa, PERRL, EOMI	  Lymphatic: No lymphadenopathy  Cardiovascular: Normal S1 S2, No JVD, + murmurs, No edema  Respiratoryrhonchi  Psychiatry: A & O x 3, Mood & affect appropriate  Gastrointestinal:  Soft, Non-tender, + BS	  Skin: No rashes, No ecchymoses, No cyanosis	  Neurologic: Non-focal  Extremities: Normal range of motion, No clubbing, cyanosis or edema  Vascular: Peripheral pulses palpable 2+ bilaterally    MEDICATIONS  (STANDING):  heparin   Injectable 5000 Unit(s) SubCutaneous every 12 hours      TELEMETRY: 	    ECG:  	  RADIOLOGY:  OTHER: 	  	  LABS:	 	    CARDIAC MARKERS:                                12.3   6.44  )-----------( 171      ( 01 Aug 2022 07:31 )             37.2     08-01    140  |  104  |  12  ----------------------------<  114<H>  3.7   |  25  |  0.81    Ca    9.9      01 Aug 2022 07:31    TPro  7.3  /  Alb  4.1  /  TBili  0.9  /  DBili  x   /  AST  16  /  ALT  8<L>  /  AlkPhos  107  08-01    proBNP: Serum Pro-Brain Natriuretic Peptide: 247 pg/mL (07-31 @ 13:46)    Lipid Profile:   HgA1c:   TSH:     < from: CT Abdomen and Pelvis w/ IV Cont (07.31.22 @ 14:52) >  Pancreatic tailmass with vascular involvement and suspected hepatic   metastatic disease.    A 1.5 cm cystic focus in the anterior left lower quadrants, etiology   unclear. This may be a benign cyst, less likely peritoneal diseas    Assessment and plan  ---------------------------  89 year old female with a PMHx of anal cancer now resolved and no other stated medical history presenting with multiple medical problems. Patient states that she has had a bump under her left breast for the last 40 years. Over the last 2 weeks the lump has been growing. Painful at night. Associated diarrhea and loss of appetite over the last few days. Denies fevers, chest pain, difficulty breathing, abdominal pain, nausea, vomiting, changes in vision, headaches. Patient also has left lower extremity edema that she states has been present for the last 2 years and has been evaluated by her physician. She states she takes no medications.  ct scan abdomen and pelvis noted with metastatic pancreatic CA  no beta blocker sec to bradycardia  suspect possible dvt, check venous doppler  palliative care  no need for any cardiac testing  add Enalapril if increase bp 5 mg daily  onc/ gi eval  awaiting venous doppler  dvt prophylaxis

## 2022-08-01 NOTE — CONSULT NOTE ADULT - SUBJECTIVE AND OBJECTIVE BOX
Chief Complaint:  Patient is a 89y old  Female who presents with a chief complaint of Pancreatic tail mass with liver lesions (01 Aug 2022 11:19)      HPI: 89F Hx of anal cancer s/p chemo/RT (2018) p/w abd pain, weight loss, LE swelling. Found to have new pancreatic mass + c/f hepatic met on imaging for which [facility] is consulted regarding obtaining a biopsy.     Reports she presented to hospital due to c/f for "lump" under her left breast. Endorsing she's been having epigastric/left upper abd pain x 2 weeks, worsening at night. Intermittent episodes of emesis according to pt, alongside up to 2 watery/soft brown BMs daily, (+) weight loss (unknown amount, thinks ?10lbs recently).    Allergies:  No Known Allergies      Home Medications:    Hospital Medications:  acetaminophen     Tablet .. 650 milliGRAM(s) Oral every 6 hours PRN  aluminum hydroxide/magnesium hydroxide/simethicone Suspension 30 milliLiter(s) Oral every 4 hours PRN  enalapril 5 milliGRAM(s) Oral daily  heparin   Injectable 5000 Unit(s) SubCutaneous every 12 hours  melatonin 3 milliGRAM(s) Oral at bedtime PRN  ondansetron Injectable 4 milliGRAM(s) IV Push every 8 hours PRN      PMHX/PSHX:  Anal cancer        Family history:      Denies family history of colon cancer/polyps, stomach cancer/polyps, pancreatic cancer/masses, liver cancer/disease, ovarian cancer and endometrial cancer.    Social History:     Tob: Denies  EtOH: Denies  Illicit Drugs: Denies    ROS:     General:  +wt loss, fevers, chills, night sweats, fatigue  Eyes:  Good vision, no reported pain  ENT:  No sore throat, pain, runny nose, dysphagia  CV:  No pain, palpitations, hypo/hypertension  Pulm:  No dyspnea, cough, tachypnea, wheezing  GI: see above  :  No pain, bleeding, incontinence, nocturia  Muscle:  No pain, weakness  Neuro:  No weakness, tingling, memory problems  Psych:  No fatigue, insomnia, mood problems, depression  Endocrine:  No polyuria, polydipsia, cold/heat intolerance  Heme:  No petechiae, ecchymosis, easy bruisability  Skin:  No rash, tattoos, scars, +LE swelling       PHYSICAL EXAM:     GENERAL:  No acute distress  HEENT:  Normocephalic/atraumatic, no scleral icterus  CHEST:  Clear to auscultation bilaterally, no wheezes/rales/ronchi, no accessory muscle use  HEART:  Regular rate and rhythm, no murmurs/rubs/gallops  ABDOMEN:  Soft, non-tender, non-distended, normoactive bowel sounds,  no masses, no hepato-splenomegaly, no signs of chronic liver disease  EXTREMITIES: No cyanosis, clubbing, or edema  SKIN:  No rash/erythema/ecchymoses/petechiae/wounds/abscess/warm/dry  NEURO:  Alert and oriented x 3, no asterixis    Vital Signs:  Vital Signs Last 24 Hrs  T(C): 36.4 (01 Aug 2022 11:52), Max: 37 (01 Aug 2022 04:20)  T(F): 97.5 (01 Aug 2022 11:52), Max: 98.6 (01 Aug 2022 04:20)  HR: 61 (01 Aug 2022 11:52) (54 - 70)  BP: 143/85 (01 Aug 2022 11:52) (125/76 - 228/79)  BP(mean): --  RR: 18 (01 Aug 2022 11:52) (16 - 19)  SpO2: 98% (01 Aug 2022 11:27) (96% - 99%)    Parameters below as of 01 Aug 2022 11:27  Patient On (Oxygen Delivery Method): room air      Daily     Daily     LABS:                        12.3   6.44  )-----------( 171      ( 01 Aug 2022 07:31 )             37.2     Mean Cell Volume: 96.6 fl (08-01-22 @ 07:31)    08-01    140  |  104  |  12  ----------------------------<  114<H>  3.7   |  25  |  0.81    Ca    9.9      01 Aug 2022 07:31    TPro  7.3  /  Alb  4.1  /  TBili  0.9  /  DBili  x   /  AST  16  /  ALT  8<L>  /  AlkPhos  107  08-01    LIVER FUNCTIONS - ( 01 Aug 2022 07:31 )  Alb: 4.1 g/dL / Pro: 7.3 g/dL / ALK PHOS: 107 U/L / ALT: 8 U/L / AST: 16 U/L / GGT: x               Amylase Serum--      Lipase serum20       Ammonia--                          12.3   6.44  )-----------( 171      ( 01 Aug 2022 07:31 )             37.2                         12.6   7.13  )-----------( 158      ( 31 Jul 2022 13:46 )             39.1       Imaging:           Chief Complaint:  Patient is a 89y old  Female who presents with a chief complaint of Pancreatic tail mass with liver lesions (01 Aug 2022 11:19)      HPI: 89F Hx of anal cancer s/p chemo/RT (2018) p/w abd pain, weight loss, LE swelling. Found to have new pancreatic mass + c/f hepatic met on imaging for which [facility] is consulted regarding obtaining a biopsy.     Reports she presented to hospital due to c/f for "lump" under her left breast. Endorsing she's been having epigastric/left upper abd pain x 2 weeks, worsening at night. Intermittent episodes of emesis according to pt, alongside up to 2 watery/soft brown BMs daily, (+) weight loss (unknown amount, thinks ?10lbs recently).    Allergies:  No Known Allergies      Home Medications:    Hospital Medications:  acetaminophen     Tablet .. 650 milliGRAM(s) Oral every 6 hours PRN  aluminum hydroxide/magnesium hydroxide/simethicone Suspension 30 milliLiter(s) Oral every 4 hours PRN  enalapril 5 milliGRAM(s) Oral daily  heparin   Injectable 5000 Unit(s) SubCutaneous every 12 hours  melatonin 3 milliGRAM(s) Oral at bedtime PRN  ondansetron Injectable 4 milliGRAM(s) IV Push every 8 hours PRN      PMHX/PSHX:  Anal cancer        Family history:  No pertinent family hx    Denies family history of colon cancer/polyps, stomach cancer/polyps, pancreatic cancer/masses, liver cancer/disease, ovarian cancer and endometrial cancer.    Social History:     Tob: Denies  EtOH: Denies  Illicit Drugs: Denies    ROS:     General:  +wt loss, fevers, chills, night sweats, fatigue  Eyes:  Good vision, no reported pain  ENT:  No sore throat, pain, runny nose, dysphagia  CV:  No pain, palpitations, hypo/hypertension  Pulm:  No dyspnea, cough, tachypnea, wheezing  GI: see above  :  No pain, bleeding, incontinence, nocturia  Muscle:  No pain, weakness  Neuro:  No weakness, tingling, memory problems  Psych:  No fatigue, insomnia, mood problems, depression  Endocrine:  No polyuria, polydipsia, cold/heat intolerance  Heme:  No petechiae, ecchymosis, easy bruisability  Skin:  No rash, tattoos, scars, +LE swelling       PHYSICAL EXAM:     GENERAL:  No acute distress  HEENT:  Normocephalic/atraumatic, no scleral icterus  CHEST:  Clear to auscultation bilaterally, no wheezes/rales/ronchi, no accessory muscle use  HEART:  Regular rate and rhythm, no murmurs/rubs/gallops  ABDOMEN:  Soft, non-tender, non-distended, normoactive bowel sounds,  no masses, no hepato-splenomegaly, no signs of chronic liver disease  EXTREMITIES: No cyanosis, clubbing, or edema  SKIN:  No rash/erythema/ecchymoses/petechiae/wounds/abscess/warm/dry  NEURO:  Alert and oriented x 3, no asterixis    Vital Signs:  Vital Signs Last 24 Hrs  T(C): 36.4 (01 Aug 2022 11:52), Max: 37 (01 Aug 2022 04:20)  T(F): 97.5 (01 Aug 2022 11:52), Max: 98.6 (01 Aug 2022 04:20)  HR: 61 (01 Aug 2022 11:52) (54 - 70)  BP: 143/85 (01 Aug 2022 11:52) (125/76 - 228/79)  BP(mean): --  RR: 18 (01 Aug 2022 11:52) (16 - 19)  SpO2: 98% (01 Aug 2022 11:27) (96% - 99%)    Parameters below as of 01 Aug 2022 11:27  Patient On (Oxygen Delivery Method): room air      Daily     Daily     LABS:                        12.3   6.44  )-----------( 171      ( 01 Aug 2022 07:31 )             37.2     Mean Cell Volume: 96.6 fl (08-01-22 @ 07:31)    08-01    140  |  104  |  12  ----------------------------<  114<H>  3.7   |  25  |  0.81    Ca    9.9      01 Aug 2022 07:31    TPro  7.3  /  Alb  4.1  /  TBili  0.9  /  DBili  x   /  AST  16  /  ALT  8<L>  /  AlkPhos  107  08-01    LIVER FUNCTIONS - ( 01 Aug 2022 07:31 )  Alb: 4.1 g/dL / Pro: 7.3 g/dL / ALK PHOS: 107 U/L / ALT: 8 U/L / AST: 16 U/L / GGT: x               Amylase Serum--      Lipase serum20       Ammonia--                          12.3   6.44  )-----------( 171      ( 01 Aug 2022 07:31 )             37.2                         12.6   7.13  )-----------( 158      ( 31 Jul 2022 13:46 )             39.1       Imaging:

## 2022-08-01 NOTE — PROGRESS NOTE ADULT - ASSESSMENT
pancreatic mass with possible liver metastases. Needs a biopsy and once pathology confirmed then can decide further oncology plan. IR was consulted. Chest CT sub cm lung nodules. LE doppler negative for DVT.

## 2022-08-01 NOTE — PROGRESS NOTE ADULT - SUBJECTIVE AND OBJECTIVE BOX
Patient is a 89y old  Female who presents with a chief complaint of abd pain (01 Aug 2022 12:52)      Medication:   acetaminophen     Tablet .. 650 milliGRAM(s) Oral every 6 hours PRN  aluminum hydroxide/magnesium hydroxide/simethicone Suspension 30 milliLiter(s) Oral every 4 hours PRN  enalapril 5 milliGRAM(s) Oral daily  melatonin 3 milliGRAM(s) Oral at bedtime PRN  ondansetron Injectable 4 milliGRAM(s) IV Push every 8 hours PRN      Physical exam    T(C): 36.4 (08-01-22 @ 11:52), Max: 37 (08-01-22 @ 04:20)  HR: 61 (08-01-22 @ 11:52) (61 - 69)  BP: 143/85 (08-01-22 @ 11:52) (125/76 - 177/76)  RR: 18 (08-01-22 @ 11:52) (16 - 18)  SpO2: 98% (08-01-22 @ 11:52) (96% - 99%)  Wt(kg): --    resting NAD  resp non labored    Labs                        12.3   6.44  )-----------( 171      ( 01 Aug 2022 07:31 )             37.2       08-01    140  |  104  |  12  ----------------------------<  114<H>  3.7   |  25  |  0.81    Ca    9.9      01 Aug 2022 07:31    TPro  7.3  /  Alb  4.1  /  TBili  0.9  /  DBili  x   /  AST  16  /  ALT  8<L>  /  AlkPhos  107  08-01      LIVER FUNCTIONS - ( 01 Aug 2022 07:31 )  Alb: 4.1 g/dL / Pro: 7.3 g/dL / ALK PHOS: 107 U/L / ALT: 8 U/L / AST: 16 U/L / GGT: x             [id number]

## 2022-08-01 NOTE — CONSULT NOTE ADULT - ASSESSMENT
89F Hx of anal cancer s/p chemo/RT (2018) p/w abd pain, weight loss, LE swelling. Found to have new pancreatic mass + c/f hepatic met on imaging for which [facility] is consulted regarding obtaining a biopsy.     Impression:   #Pancreatic mass: CT a/p (7/31): infiltrative mass in the pancreatic tail (2:26) 4.9 x 4.5 cm.   Less than 180 degrees involvement of the SMV.. Evaluation for arterial involvement is limited at single phase imaging. Currently symptomatic with epigastric/left sided abd pain + weight loss without e/o biliary obstruction (normal bile ducts + Tbili)     #Hepatic mass: CT a/p (7/31) A hypodense lesion in hepatic segment IVb (2:38) 2.5 x 2.1 cm)   concerning for metastasis. A 1.1 x 0.9 cm right hepatic lobe lesion (2:19) is indeterminate. Additional hepatic cysts and subcentimeter foci   too small to characterize.      Recommendations:   - Plan for EUS guided biopsy of pancreatic mass + hepatic met tomorrow 8/1  - NPO after MN  - Hold PM + AM dose of lovenox  - Obtain AM labs early (3am) to avoid procedure delays      Thank you for involving us in the care of this patient, please reach out if any further questions.     Eric Reardon MD  Gastroenterology/Hepatology Fellow, PGY6    Available on [employer] Teams  [phone] ([facility])  [id number] ([facility])  Please contact on call fellow weekdays after 5pm-7am and weekends: [phone]

## 2022-08-02 ENCOUNTER — RESULT REVIEW (OUTPATIENT)
Age: 87
End: 2022-08-02

## 2022-08-02 LAB
ANION GAP SERPL CALC-SCNC: 12 MMOL/L — SIGNIFICANT CHANGE UP (ref 5–17)
APTT BLD: 27.6 SEC — SIGNIFICANT CHANGE UP (ref 27.5–35.5)
BUN SERPL-MCNC: 28 MG/DL — HIGH (ref 7–23)
CALCIUM SERPL-MCNC: 9.1 MG/DL — SIGNIFICANT CHANGE UP (ref 8.4–10.5)
CHLORIDE SERPL-SCNC: 99 MMOL/L — SIGNIFICANT CHANGE UP (ref 96–108)
CO2 SERPL-SCNC: 22 MMOL/L — SIGNIFICANT CHANGE UP (ref 22–31)
CREAT SERPL-MCNC: 1.13 MG/DL — SIGNIFICANT CHANGE UP (ref 0.5–1.3)
EGFR: 47 ML/MIN/1.73M2 — LOW
GLUCOSE SERPL-MCNC: 110 MG/DL — HIGH (ref 70–99)
HCT VFR BLD CALC: 37.4 % — SIGNIFICANT CHANGE UP (ref 34.5–45)
HGB BLD-MCNC: 12.1 G/DL — SIGNIFICANT CHANGE UP (ref 11.5–15.5)
INR BLD: 1.03 RATIO — SIGNIFICANT CHANGE UP (ref 0.88–1.16)
MAGNESIUM SERPL-MCNC: 1.9 MG/DL — SIGNIFICANT CHANGE UP (ref 1.6–2.6)
MCHC RBC-ENTMCNC: 31.6 PG — SIGNIFICANT CHANGE UP (ref 27–34)
MCHC RBC-ENTMCNC: 32.4 GM/DL — SIGNIFICANT CHANGE UP (ref 32–36)
MCV RBC AUTO: 97.7 FL — SIGNIFICANT CHANGE UP (ref 80–100)
NRBC # BLD: 0 /100 WBCS — SIGNIFICANT CHANGE UP (ref 0–0)
PLATELET # BLD AUTO: 185 K/UL — SIGNIFICANT CHANGE UP (ref 150–400)
POTASSIUM SERPL-MCNC: 4 MMOL/L — SIGNIFICANT CHANGE UP (ref 3.5–5.3)
POTASSIUM SERPL-SCNC: 4 MMOL/L — SIGNIFICANT CHANGE UP (ref 3.5–5.3)
PROTHROM AB SERPL-ACNC: 11.9 SEC — SIGNIFICANT CHANGE UP (ref 10.5–13.4)
RBC # BLD: 3.83 M/UL — SIGNIFICANT CHANGE UP (ref 3.8–5.2)
RBC # FLD: 13 % — SIGNIFICANT CHANGE UP (ref 10.3–14.5)
SODIUM SERPL-SCNC: 133 MMOL/L — LOW (ref 135–145)
WBC # BLD: 7.39 K/UL — SIGNIFICANT CHANGE UP (ref 3.8–10.5)
WBC # FLD AUTO: 7.39 K/UL — SIGNIFICANT CHANGE UP (ref 3.8–10.5)

## 2022-08-02 PROCEDURE — 88173 CYTOPATH EVAL FNA REPORT: CPT | Mod: 26

## 2022-08-02 PROCEDURE — 43242 EGD US FINE NEEDLE BX/ASPIR: CPT

## 2022-08-02 PROCEDURE — 88307 TISSUE EXAM BY PATHOLOGIST: CPT | Mod: 26

## 2022-08-02 PROCEDURE — 88305 TISSUE EXAM BY PATHOLOGIST: CPT | Mod: 26

## 2022-08-02 RX ORDER — BENZOCAINE AND MENTHOL 5; 1 G/100ML; G/100ML
1 LIQUID ORAL
Refills: 0 | Status: DISCONTINUED | OUTPATIENT
Start: 2022-08-02 | End: 2022-08-04

## 2022-08-02 RX ORDER — CHLORHEXIDINE GLUCONATE 213 G/1000ML
1 SOLUTION TOPICAL DAILY
Refills: 0 | Status: DISCONTINUED | OUTPATIENT
Start: 2022-08-02 | End: 2022-08-04

## 2022-08-02 RX ORDER — GUAIFENESIN/DEXTROMETHORPHAN 600MG-30MG
10 TABLET, EXTENDED RELEASE 12 HR ORAL EVERY 6 HOURS
Refills: 0 | Status: DISCONTINUED | OUTPATIENT
Start: 2022-08-02 | End: 2022-08-04

## 2022-08-02 RX ORDER — SODIUM CHLORIDE 9 MG/ML
500 INJECTION INTRAMUSCULAR; INTRAVENOUS; SUBCUTANEOUS
Refills: 0 | Status: DISCONTINUED | OUTPATIENT
Start: 2022-08-02 | End: 2022-08-04

## 2022-08-02 RX ADMIN — BENZOCAINE AND MENTHOL 1 LOZENGE: 5; 1 LIQUID ORAL at 16:43

## 2022-08-02 RX ADMIN — Medication 5 MILLIGRAM(S): at 06:47

## 2022-08-02 NOTE — PROGRESS NOTE ADULT - ASSESSMENT
pancreatic mass with possible liver metastases. Needs a biopsy and once pathology confirmed then can decide further oncology plan. IR and GI  consulted. Chest CT sub cm lung nodules. LE doppler negative for DVT.  GI planning EUS

## 2022-08-02 NOTE — PROGRESS NOTE ADULT - ASSESSMENT
( Note written / Date of service 08-02-22 )    ==================>> MEDICATIONS <<====================    chlorhexidine 2% Cloths 1 Application(s) Topical daily  enalapril 5 milliGRAM(s) Oral daily  sodium chloride 0.9%. 500 milliLiter(s) IV Continuous <Continuous>    MEDICATIONS  (PRN):  acetaminophen     Tablet .. 650 milliGRAM(s) Oral every 6 hours PRN Temp greater or equal to 38C (100.4F), Mild Pain (1 - 3)  aluminum hydroxide/magnesium hydroxide/simethicone Suspension 30 milliLiter(s) Oral every 4 hours PRN Dyspepsia  melatonin 3 milliGRAM(s) Oral at bedtime PRN Insomnia  ondansetron Injectable 4 milliGRAM(s) IV Push every 8 hours PRN Nausea and/or Vomiting    ___________  Active diet:  Diet, Regular  ___________________    ==================>> VITAL SIGNS <<==================  Height (cm): 152.4  Weight (kg): 54.4  BMI (kg/m2): 23.4  Vital Signs Last 24 HrsT(C): 36.4 (08-02-22 @ 13:48)  T(F): 97.5 (08-02-22 @ 13:48), Max: 99.3 (08-02-22 @ 09:51)  HR: 81 (08-02-22 @ 13:48) (58 - 88)  BP: 130/72 (08-02-22 @ 13:48)  RR: 17 (08-02-22 @ 13:48) (15 - 18)  SpO2: 96% (08-02-22 @ 13:48) (91% - 99%)      CAPILLARY BLOOD GLUCOSE         ==================>> LAB AND IMAGING <<==================                        12.1   7.39  )-----------( 185      ( 02 Aug 2022 06:59 )             37.4        08-02    133<L>  |  99  |  28<H>  ----------------------------<  110<H>  4.0   |  22  |  1.13    Ca    9.1      02 Aug 2022 07:02  Mg     1.9     08-02    TPro  7.3  /  Alb  4.1  /  TBili  0.9  /  DBili  x   /  AST  16  /  ALT  8<L>  /  AlkPhos  107  08-01    WBC count:   7.39 <<== ,  6.44 <<== ,  7.13 <<==   Hemoglobin:   12.1 <<==,  12.3 <<==,  12.6 <<==  platelets:  185 <==, 171 <==, 158 <==    Creatinine:  1.13  <<==, 0.81  <<==, 0.78  <<==  Sodium:   133  <==, 140  <==, 138  <==       AST:          16 <== , 33 <==      ALT:        8  <== , 11  <==      AP:        107  <=, 114  <=     Bili:        0.9  <=, 0.8  <=    ____________________________    M I C R O B I O L O G Y :      COVID-19 PCR: Skip (07-31-22 @ 13:46)     _________________________________________________________________________________________  ========>>  M E D I C A L   A T T E N D I N G    F O L L O W  U P  N O T E  <<=========  -----------------------------------------------------------------------------------------------------    - Patient seen and examined by me earlier today.   - In summary,  NIKI DOMINGO is a 89y year old woman admitted with mass   - Patient today overall doing ok, comfortable, pt recetnly back from EUS, + some sore throat and occasionally cough, otherwise doing well     ==================>> REVIEW OF SYSTEM <<=================    GEN: no fever, no chills, no pain, as above   RESP: no SOB, occasional cough, no sputum  CVS: no chest pain, no palpitations, no edema  GI: no abdominal pain, no nausea,  some diarrhea  : no dysuria, no frequency, no hematuria  Neuro: no headache, no dizziness  Derm : no itching, no rash    ==================>> PHYSICAL EXAM <<=================    GEN: A&O X 3 , NAD , comfortable, pleasant, calm , cachectic , resting in bed   HEENT: NCAT, PERRL, MMM, hearing intact  Neck: supple , no JVD appreciated  CVS: S1S2 , regular , No M/R/G appreciated  PULM: CTA B/L,  no W/R/R appreciated  ABD.: soft. non tender, non distended,  bowel sounds present  Extrem: intact pulses , no edema   PSYCH : normal mood,  not anxious                             ( Note written / Date of service 08-02-22 )    ==================>> MEDICATIONS <<====================    chlorhexidine 2% Cloths 1 Application(s) Topical daily  enalapril 5 milliGRAM(s) Oral daily  sodium chloride 0.9%. 500 milliLiter(s) IV Continuous <Continuous>    MEDICATIONS  (PRN):  acetaminophen     Tablet .. 650 milliGRAM(s) Oral every 6 hours PRN Temp greater or equal to 38C (100.4F), Mild Pain (1 - 3)  aluminum hydroxide/magnesium hydroxide/simethicone Suspension 30 milliLiter(s) Oral every 4 hours PRN Dyspepsia  melatonin 3 milliGRAM(s) Oral at bedtime PRN Insomnia  ondansetron Injectable 4 milliGRAM(s) IV Push every 8 hours PRN Nausea and/or Vomiting    ___________  Active diet:  Diet, Regular  ___________________    ==================>> VITAL SIGNS <<==================    Height (cm): 152.4  Weight (kg): 54.4  BMI (kg/m2): 23.4  Vital Signs Last 24 HrsT(C): 36.4 (08-02-22 @ 13:48)  T(F): 97.5 (08-02-22 @ 13:48), Max: 99.3 (08-02-22 @ 09:51)  HR: 81 (08-02-22 @ 13:48) (58 - 88)  BP: 130/72 (08-02-22 @ 13:48)  RR: 17 (08-02-22 @ 13:48) (15 - 18)  SpO2: 96% (08-02-22 @ 13:48) (91% - 99%)       ==================>> LAB AND IMAGING <<==================                        12.1   7.39  )-----------( 185      ( 02 Aug 2022 06:59 )             37.4        08-02    133<L>  |  99  |  28<H>  ----------------------------<  110<H>  4.0   |  22  |  1.13    Ca    9.1      02 Aug 2022 07:02  Mg     1.9     08-02    TPro  7.3  /  Alb  4.1  /  TBili  0.9  /  DBili  x   /  AST  16  /  ALT  8<L>  /  AlkPhos  107  08-01    WBC count:   7.39 <<== ,  6.44 <<== ,  7.13 <<==   Hemoglobin:   12.1 <<==,  12.3 <<==,  12.6 <<==  platelets:  185 <==, 171 <==, 158 <==    Creatinine:  1.13  <<==, 0.81  <<==, 0.78  <<==  Sodium:   133  <==, 140  <==, 138  <==       AST:          16 <== , 33 <==      ALT:        8  <== , 11  <==      AP:        107  <=, 114  <=     Bili:        0.9  <=, 0.8  <=       < from: CT Abdomen and Pelvis w/ IV Cont (07.31.22 @ 14:52) >  IMPRESSION:  Pancreatic tail mass with vascular involvement and suspected hepatic  metastatic disease.  A 1.5 cm cystic focus in the anterior left lower quadrants, etiology   unclear. This may be a benign cyst, less likely peritoneal disease.  < end of copied text >    < from: CT Chest No Cont (07.31.22 @ 23:21) >  IMPRESSION:  Bilateral sub-3 mm nodules.  < end of copied text >    < from: [facility] Duplex Lower Ext Vein Scan, Bilat (08.01.22 @ 15:39) >  IMPRESSION:  No evidence of deep venous thrombosis in either lower extremity.  < end of copied text >    ___________________________________________________________________________________  ===============>>  A S S E S S M E N T   A N D   P L A N <<===============  ------------------------------------------------------------------------------------------    · Assessment	  Patient is a 88yo Female with past medical history of anal cancer in 2018 post Chemo and RT, chronic leg swelling, presenting with abdominal discomfort.   Patient states that she has had a lump under her left breast for the last 40 years. Over the last 2 weeks the lump has been growing. Painful / uncomfortable at night.   also with c/o of on and off diarrhea and loss of appetite over the last few days.   Denies fevers, chest pain, difficulty breathing, abdominal pain, nausea, vomiting, changes in weight.  Patient also has left lower extremity edema that she states has been present for the last 2 years and has been evaluated by her physician. She states she takes no medications  in ED pt found to have likely metastatic pancreatic cancer      Problem/Plan - 1:  ·  Problem: Pancreatic mass.   ·  Plan: likely pancreatic cancer with mets  CEA elevated , CA 19-9 negative !!!   CT as noted  CT chest and duplex as above   onc appreciated  IR noted > GI consult  Gi appreciated >> EUS today >> follow report  follow path     Problem/Plan - 2:  ·  Problem: Bradycardia.   ·  Plan: monitor for now avoid AV deb blocker  cardio appreciated  nutrition   monitor.    Problem/Plan - 3:  ·  Problem: Non-infective diarrhea.   ·  Plan: unclear etiology of diarrhea  not appear to be significant based on history  appears improved   diet as tolerated >> encourage PO intake given hyponatremia     Problem/Plan - 4:  ·  Problem: Leg edema. >> improved   ·  Plan: chronic  Duplex negative for DVT.  protein intake  leg elevation     -GI/DVT Prophylaxis per protocol.    possible DC planing home in 24-48 hrs if stable with OP f/u with onc     --------------------------------------------  Case discussed with pt's brets and other family members at bedside, an over the phone, RN   Education given on findings and plan of care  ___________________________  H. RAMONITA Lucero.  Pager: [phone]       _________________________________________________________________________________________  ========>>  M E D I C A L   A T T E N D I N G    F O L L O W  U P  N O T E  <<=========  -----------------------------------------------------------------------------------------------------    - Patient seen and examined by me earlier today.   - In summary,  NIKI DOMINGO is a 89y year old woman admitted with mass   - Patient today overall doing ok, comfortable, pt recetnly back from EUS, + some sore throat and occasionally cough, otherwise doing well     ==================>> REVIEW OF SYSTEM <<=================    GEN: no fever, no chills, no pain, as above   RESP: no SOB, occasional cough, no sputum  CVS: no chest pain, no palpitations, no edema  GI: no abdominal pain, no nausea,  some diarrhea  : no dysuria, no frequency, no hematuria  Neuro: no headache, no dizziness  Derm : no itching, no rash    ==================>> PHYSICAL EXAM <<=================    GEN: A&O X 3 , NAD , comfortable, pleasant, calm , cachectic , resting in bed   HEENT: NCAT, PERRL, MMM, hearing intact  Neck: supple , no JVD appreciated  CVS: S1S2 , regular , No M/R/G appreciated  PULM: CTA B/L,  no W/R/R appreciated  ABD.: soft. non tender, non distended,  bowel sounds present  Extrem: intact pulses , no edema   PSYCH : normal mood,  not anxious                             ( Note written / Date of service 08-02-22 )    ==================>> MEDICATIONS <<====================    chlorhexidine 2% Cloths 1 Application(s) Topical daily  enalapril 5 milliGRAM(s) Oral daily  sodium chloride 0.9%. 500 milliLiter(s) IV Continuous <Continuous>    MEDICATIONS  (PRN):  acetaminophen     Tablet .. 650 milliGRAM(s) Oral every 6 hours PRN Temp greater or equal to 38C (100.4F), Mild Pain (1 - 3)  aluminum hydroxide/magnesium hydroxide/simethicone Suspension 30 milliLiter(s) Oral every 4 hours PRN Dyspepsia  melatonin 3 milliGRAM(s) Oral at bedtime PRN Insomnia  ondansetron Injectable 4 milliGRAM(s) IV Push every 8 hours PRN Nausea and/or Vomiting    ___________  Active diet:  Diet, Regular  ___________________    ==================>> VITAL SIGNS <<==================    Height (cm): 152.4  Weight (kg): 54.4  BMI (kg/m2): 23.4  Vital Signs Last 24 HrsT(C): 36.4 (08-02-22 @ 13:48)  T(F): 97.5 (08-02-22 @ 13:48), Max: 99.3 (08-02-22 @ 09:51)  HR: 81 (08-02-22 @ 13:48) (58 - 88)  BP: 130/72 (08-02-22 @ 13:48)  RR: 17 (08-02-22 @ 13:48) (15 - 18)  SpO2: 96% (08-02-22 @ 13:48) (91% - 99%)       ==================>> LAB AND IMAGING <<==================                        12.1   7.39  )-----------( 185      ( 02 Aug 2022 06:59 )             37.4        08-02    133<L>  |  99  |  28<H>  ----------------------------<  110<H>  4.0   |  22  |  1.13    Ca    9.1      02 Aug 2022 07:02  Mg     1.9     08-02    TPro  7.3  /  Alb  4.1  /  TBili  0.9  /  DBili  x   /  AST  16  /  ALT  8<L>  /  AlkPhos  107  08-01    WBC count:   7.39 <<== ,  6.44 <<== ,  7.13 <<==   Hemoglobin:   12.1 <<==,  12.3 <<==,  12.6 <<==  platelets:  185 <==, 171 <==, 158 <==    Creatinine:  1.13  <<==, 0.81  <<==, 0.78  <<==  Sodium:   133  <==, 140  <==, 138  <==       AST:          16 <== , 33 <==      ALT:        8  <== , 11  <==      AP:        107  <=, 114  <=     Bili:        0.9  <=, 0.8  <=       < from: CT Abdomen and Pelvis w/ IV Cont (07.31.22 @ 14:52) >  IMPRESSION:  Pancreatic tail mass with vascular involvement and suspected hepatic  metastatic disease.  A 1.5 cm cystic focus in the anterior left lower quadrants, etiology   unclear. This may be a benign cyst, less likely peritoneal disease.  < end of copied text >    < from: CT Chest No Cont (07.31.22 @ 23:21) >  IMPRESSION:  Bilateral sub-3 mm nodules.  < end of copied text >    < from: [facility] Duplex Lower Ext Vein Scan, Bilat (08.01.22 @ 15:39) >  IMPRESSION:  No evidence of deep venous thrombosis in either lower extremity.  < end of copied text >    ___________________________________________________________________________________  ===============>>  A S S E S S M E N T   A N D   P L A N <<===============  ------------------------------------------------------------------------------------------    · Assessment	  Patient is a 89yo Female with past medical history of anal cancer in 2018 post Chemo and RT, chronic leg swelling, presenting with abdominal discomfort.   Patient states that she has had a lump under her left breast for the last 40 years. Over the last 2 weeks the lump has been growing. Painful / uncomfortable at night.   also with c/o of on and off diarrhea and loss of appetite over the last few days.   Denies fevers, chest pain, difficulty breathing, abdominal pain, nausea, vomiting, changes in weight.  Patient also has left lower extremity edema that she states has been present for the last 2 years and has been evaluated by her physician. She states she takes no medications  in ED pt found to have likely metastatic pancreatic cancer      Problem/Plan - 1:  ·  Problem: Pancreatic mass.   ·  Plan: likely pancreatic cancer with mets  CEA elevated , CA 19-9 negative !!!   CT as noted  CT chest and duplex as above   onc appreciated  IR noted > GI consult  Gi appreciated >> EUS today >> follow report  follow path     Problem/Plan - 2:  ·  Problem: Bradycardia.   ·  Plan: monitor for now avoid AV deb blocker  cardio appreciated  nutrition   monitor.    Problem/Plan - 3:  ·  Problem: Non-infective diarrhea.   ·  Plan: unclear etiology of diarrhea  not appear to be significant based on history  appears improved   diet as tolerated >> encourage PO intake given hyponatremia     Problem/Plan - 4:  ·  Problem: Leg edema. >> improved   ·  Plan: chronic  Duplex negative for DVT.  protein intake  leg elevation     -GI/DVT Prophylaxis per protocol.    possible DC planing home in 24-48 hrs if stable with OP f/u with onc     --------------------------------------------  Case discussed with pt's rené and other family members at bedside, an over the phone, RN   Education given on findings and plan of care    Today I had a prolonged conversation with the patient, NIKI DELAROSAMAN, kamille diagnosis, findings, and plan of care, options, alternatives, prognosis... . Patient verbalized clear understanding, all questions answered.    patient seen according to [facility] cost code ( 81560 )     Additional time spent  35 min.    ___________________________  H. RAMONITA Lucero.  Pager: [phone]

## 2022-08-02 NOTE — PROGRESS NOTE ADULT - SUBJECTIVE AND OBJECTIVE BOX
CARDIOLOGY     PROGRESS  NOTE   ________________________________________________    CHIEF COMPLAINT:Patient is a 89y old  Female who presents with a chief complaint of abd pain (01 Aug 2022 12:52)  no complain.  	  REVIEW OF SYSTEMS:  CONSTITUTIONAL: No fever, weight loss, or fatigue  EYES: No eye pain, visual disturbances, or discharge  ENT:  No difficulty hearing, tinnitus, vertigo; No sinus or throat pain  NECK: No pain or stiffness  RESPIRATORY: No cough, wheezing, chills or hemoptysis; No Shortness of Breath  CARDIOVASCULAR: No chest pain, palpitations, passing out, dizziness, or leg swelling  GASTROINTESTINAL: No abdominal or epigastric pain. No nausea, vomiting, or hematemesis; No diarrhea or constipation. No melena or hematochezia.  GENITOURINARY: No dysuria, frequency, hematuria, or incontinence  NEUROLOGICAL: No headaches, memory loss, loss of strength, numbness, or tremors  SKIN: No itching, burning, rashes, or lesions   LYMPH Nodes: No enlarged glands  ENDOCRINE: No heat or cold intolerance; No hair loss  MUSCULOSKELETAL: No joint pain or swelling; No muscle, back, or extremity pain  PSYCHIATRIC: No depression, anxiety, mood swings, or difficulty sleeping  HEME/LYMPH: No easy bruising, or bleeding gums  ALLERGY AND IMMUNOLOGIC: No hives or eczema	    [ ] All others negative	  [ ] Unable to obtain    PHYSICAL EXAM:  T(C): 36.3 (08-02-22 @ 09:23), Max: 36.7 (08-02-22 @ 00:21)  HR: 71 (08-02-22 @ 09:23) (61 - 78)  BP: 127/68 (08-02-22 @ 09:23) (125/76 - 144/83)  RR: 18 (08-02-22 @ 09:23) (18 - 18)  SpO2: 98% (08-02-22 @ 09:23) (91% - 99%)  Wt(kg): --  I&O's Summary      Appearance: Normal	  HEENT:   Normal oral mucosa, PERRL, EOMI	  Lymphatic: No lymphadenopathy  Cardiovascular: Normal S1 S2, No JVD, + murmurs, No edema  Respiratory: Lungs clear to auscultation	  Psychiatry: A & O x 3, Mood & affect appropriate  Gastrointestinal:  Soft, Non-tender, + BS	  Skin: No rashes, No ecchymoses, No cyanosis	  Neurologic: Non-focal  Extremities: Normal range of motion, No clubbing, cyanosis or edema  Vascular: Peripheral pulses palpable 2+ bilaterally    MEDICATIONS  (STANDING):  chlorhexidine 2% Cloths 1 Application(s) Topical daily  enalapril 5 milliGRAM(s) Oral daily      TELEMETRY: 	    ECG:  	  RADIOLOGY:  OTHER: 	  	  LABS:	 	    CARDIAC MARKERS:                                12.1   7.39  )-----------( 185      ( 02 Aug 2022 06:59 )             37.4     08-02    133<L>  |  99  |  28<H>  ----------------------------<  110<H>  4.0   |  22  |  1.13    Ca    9.1      02 Aug 2022 07:02  Mg     1.9     08-02    TPro  7.3  /  Alb  4.1  /  TBili  0.9  /  DBili  x   /  AST  16  /  ALT  8<L>  /  AlkPhos  107  08-01    proBNP: Serum Pro-Brain Natriuretic Peptide: 247 pg/mL (07-31 @ 13:46)    Lipid Profile: Cholesterol 216  LDL --  HDL 76  TG 63    HgA1c:   TSH:   PT/INR - ( 02 Aug 2022 07:03 )   PT: 11.9 sec;   INR: 1.03 ratio         PTT - ( 02 Aug 2022 07:03 )  PTT:27.6 sec    - Plan for EUS guided biopsy of pancreatic mass + hepatic met tomorrow 8/1  - NPO after MN  - Hold PM + AM dose of lovenox  - Obtain AM labs early (3am) to avoid procedure delays    < from: [facility] Duplex Lower Ext Vein Scan, Bilat (08.01.22 @ 15:39) >  No evidence of deep venous thrombosis in either lower extremity.    < end of copied text >      Assessment and plan  ---------------------------  89 year old female with a PMHx of anal cancer now resolved and no other stated medical history presenting with multiple medical problems. Patient states that she has had a bump under her left breast for the last 40 years. Over the last 2 weeks the lump has been growing. Painful at night. Associated diarrhea and loss of appetite over the last few days. Denies fevers, chest pain, difficulty breathing, abdominal pain, nausea, vomiting, changes in vision, headaches. Patient also has left lower extremity edema that she states has been present for the last 2 years and has been evaluated by her physician. She states she takes no medications.  ct scan abdomen and pelvis noted with metastatic pancreatic CA  no beta blocker sec to bradycardia  suspect possible dvt, check venous doppler  palliative care  no need for any cardiac testing  add Enalapril if increase bp 5 mg daily  onc/ gi eval  negative venous doppler  dvt prophylaxis  pt will be clear for ERCP

## 2022-08-02 NOTE — PRE PROCEDURE NOTE - PRE PROCEDURE EVALUATION
Attending Physician:    Dr. Arora                        Procedure:   EGD-EUS    Indication for Procedure:   Concern for metastatic disease  ________________________________________________________  PAST MEDICAL & SURGICAL HISTORY:    Anal cancer      ALLERGIES:  No Known Allergies    HOME MEDICATIONS:    AICD/PPM: [ ] yes   [ X] no    PERTINENT LAB DATA:                        12.1   7.39  )-----------( 185      ( 02 Aug 2022 06:59 )             37.4     08-02    133<L>  |  99  |  28<H>  ----------------------------<  110<H>  4.0   |  22  |  1.13    Ca    9.1      02 Aug 2022 07:02  Mg     1.9     08-02  TPro  7.3  /  Alb  4.1  /  TBili  0.9  /  DBili  x   /  AST  16  /  ALT  8<L>  /  AlkPhos  107  08-01  PT/INR - ( 02 Aug 2022 07:03 )   PT: 11.9 sec;   INR: 1.03 ratio    PTT - ( 02 Aug 2022 07:03 )  PTT:27.6 sec    PHYSICAL EXAMINATION:    T(C): 36.3  HR: 71  BP: 127/68  RR: 18  SpO2: 98%    Constitutional: NAD    Neck:  No JVD  Respiratory: CTAB/L  Cardiovascular: S1 and S2  Gastrointestinal: BS+, soft, NT/ND  Extremities: No peripheral edema  Neurological: A/O x 4      COMMENTS:    The patient is a suitable candidate for the planned procedure unless box checked [ ]  No, explain:

## 2022-08-02 NOTE — PROGRESS NOTE ADULT - SUBJECTIVE AND OBJECTIVE BOX
NIKI DOMINGO  MRN-[id number]    Patient is a 89y old  Female who presents with a chief complaint of abd pain (01 Aug 2022 12:52)      Review of System  REVIEW OF SYSTEMS      General:	Denies fatigue, fevers, chills, sweats, decreased appetite.    Skin/Breast: denies pruritis, rash  	  Ophthalmologic: no change in vision or blurring  	  HEENT	Denies dry mouth, oral sores, dysphagia,  change in hearing.    Respiratory and Thorax:  cough, sob, wheeze, hemoptysis  	  Cardiovascular:	no cp , palp, orthopnea    Gastrointestinal:	no n/v/d constipation    Genitourinary:	no dysuria of frequency, no hematuria, no flank pain    Musculoskeletal:	no bone or joint pain. no muscle aches.     Neurological:	no change in sensory or motor function. no headache. no weakness.     Psychiatric:	no depression, no anxiety, insomnia.     Hematology/Lymphatics:	no bleeding or bruising        Current Meds  MEDICATIONS  (STANDING):  enalapril 5 milliGRAM(s) Oral daily    MEDICATIONS  (PRN):  acetaminophen     Tablet .. 650 milliGRAM(s) Oral every 6 hours PRN Temp greater or equal to 38C (100.4F), Mild Pain (1 - 3)  aluminum hydroxide/magnesium hydroxide/simethicone Suspension 30 milliLiter(s) Oral every 4 hours PRN Dyspepsia  melatonin 3 milliGRAM(s) Oral at bedtime PRN Insomnia  ondansetron Injectable 4 milliGRAM(s) IV Push every 8 hours PRN Nausea and/or Vomiting      Vitals  Vital Signs Last 24 Hrs  T(C): 36.7 (02 Aug 2022 00:21), Max: 36.7 (02 Aug 2022 00:21)  T(F): 98 (02 Aug 2022 00:21), Max: 98 (02 Aug 2022 00:21)  HR: 78 (02 Aug 2022 00:21) (61 - 78)  BP: 144/67 (02 Aug 2022 00:21) (125/76 - 177/76)  BP(mean): --  RR: 18 (02 Aug 2022 00:21) (16 - 18)  SpO2: 91% (02 Aug 2022 00:21) (91% - 99%)    Parameters below as of 02 Aug 2022 00:21  Patient On (Oxygen Delivery Method): room air        Physical Exam  PHYSICAL EXAM:      Constitutional: NAD    Eyes: PERRLA EOMI, anicteric sclera    Heent :No oral sores, no pharyngeal injection. moist mucosa.    Neck: supple, no jvd, no LAD    Respiratory: CTA b/l     Cardiovascular: s1s2, no m/g/r    Gastrointestinal: soft, nt, nd, + BS    Extremities: no c/c/e    Neurological:A&O x 3 moves all ext.    Skin: no rash on exposed skin    Lymph Nodes: no lymphadenopathy.              Lab  CBC Full  -  ( 01 Aug 2022 07:31 )  WBC Count : 6.44 K/uL  RBC Count : 3.85 M/uL  Hemoglobin : 12.3 g/dL  Hematocrit : 37.2 %  Platelet Count - Automated : 171 K/uL  Mean Cell Volume : 96.6 fl  Mean Cell Hemoglobin : 31.9 pg  Mean Cell Hemoglobin Concentration : 33.1 gm/dL  Auto Neutrophil # : 4.15 K/uL  Auto Lymphocyte # : 1.53 K/uL  Auto Monocyte # : 0.61 K/uL  Auto Eosinophil # : 0.11 K/uL  Auto Basophil # : 0.02 K/uL  Auto Neutrophil % : 64.4 %  Auto Lymphocyte % : 23.8 %  Auto Monocyte % : 9.5 %  Auto Eosinophil % : 1.7 %  Auto Basophil % : 0.3 %    08-01    140  |  104  |  12  ----------------------------<  114<H>  3.7   |  25  |  0.81    Ca    9.9      01 Aug 2022 07:31    TPro  7.3  /  Alb  4.1  /  TBili  0.9  /  DBili  x   /  AST  16  /  ALT  8<L>  /  AlkPhos  107  08-01        Rad:    Assessment/Plan   NIKI DOMINGO  MRN-[id number]    Patient is a 89y old  Female who presents with a chief complaint of abd pain (01 Aug 2022 12:52)      Review of System    Resting comfortably  no new events overnight    Current Meds  MEDICATIONS  (STANDING):  enalapril 5 milliGRAM(s) Oral daily    MEDICATIONS  (PRN):  acetaminophen     Tablet .. 650 milliGRAM(s) Oral every 6 hours PRN Temp greater or equal to 38C (100.4F), Mild Pain (1 - 3)  aluminum hydroxide/magnesium hydroxide/simethicone Suspension 30 milliLiter(s) Oral every 4 hours PRN Dyspepsia  melatonin 3 milliGRAM(s) Oral at bedtime PRN Insomnia  ondansetron Injectable 4 milliGRAM(s) IV Push every 8 hours PRN Nausea and/or Vomiting      Vitals  Vital Signs Last 24 Hrs  T(C): 36.7 (02 Aug 2022 00:21), Max: 36.7 (02 Aug 2022 00:21)  T(F): 98 (02 Aug 2022 00:21), Max: 98 (02 Aug 2022 00:21)  HR: 78 (02 Aug 2022 00:21) (61 - 78)  BP: 144/67 (02 Aug 2022 00:21) (125/76 - 177/76)  BP(mean): --  RR: 18 (02 Aug 2022 00:21) (16 - 18)  SpO2: 91% (02 Aug 2022 00:21) (91% - 99%)    Parameters below as of 02 Aug 2022 00:21  Patient On (Oxygen Delivery Method): room air    PHYSICAL EXAM:     NAD    Lab  CBC Full  -  ( 01 Aug 2022 07:31 )  WBC Count : 6.44 K/uL  RBC Count : 3.85 M/uL  Hemoglobin : 12.3 g/dL  Hematocrit : 37.2 %  Platelet Count - Automated : 171 K/uL  Mean Cell Volume : 96.6 fl  Mean Cell Hemoglobin : 31.9 pg  Mean Cell Hemoglobin Concentration : 33.1 gm/dL  Auto Neutrophil # : 4.15 K/uL  Auto Lymphocyte # : 1.53 K/uL  Auto Monocyte # : 0.61 K/uL  Auto Eosinophil # : 0.11 K/uL  Auto Basophil # : 0.02 K/uL  Auto Neutrophil % : 64.4 %  Auto Lymphocyte % : 23.8 %  Auto Monocyte % : 9.5 %  Auto Eosinophil % : 1.7 %  Auto Basophil % : 0.3 %    08-01    140  |  104  |  12  ----------------------------<  114<H>  3.7   |  25  |  0.81    Ca    9.9      01 Aug 2022 07:31    TPro  7.3  /  Alb  4.1  /  TBili  0.9  /  DBili  x   /  AST  16  /  ALT  8<L>  /  AlkPhos  107  08-01        Rad:    Assessment/Plan

## 2022-08-02 NOTE — PRE-ANESTHESIA EVALUATION ADULT - NSANTHOSAYNRD_GEN_A_CORE
No. FRANTZ screening performed.  STOP BANG Legend: 0-2 = LOW Risk; 3-4 = INTERMEDIATE Risk; 5-8 = HIGH Risk

## 2022-08-03 LAB
ALBUMIN SERPL ELPH-MCNC: 3.4 G/DL — SIGNIFICANT CHANGE UP (ref 3.3–5)
ALP SERPL-CCNC: 81 U/L — SIGNIFICANT CHANGE UP (ref 40–120)
ALT FLD-CCNC: 9 U/L — LOW (ref 10–45)
ANION GAP SERPL CALC-SCNC: 13 MMOL/L — SIGNIFICANT CHANGE UP (ref 5–17)
AST SERPL-CCNC: 24 U/L — SIGNIFICANT CHANGE UP (ref 10–40)
BASOPHILS # BLD AUTO: 0.02 K/UL — SIGNIFICANT CHANGE UP (ref 0–0.2)
BASOPHILS NFR BLD AUTO: 0.1 % — SIGNIFICANT CHANGE UP (ref 0–2)
BILIRUB SERPL-MCNC: 1.3 MG/DL — HIGH (ref 0.2–1.2)
BUN SERPL-MCNC: 30 MG/DL — HIGH (ref 7–23)
CALCIUM SERPL-MCNC: 8.5 MG/DL — SIGNIFICANT CHANGE UP (ref 8.4–10.5)
CHLORIDE SERPL-SCNC: 100 MMOL/L — SIGNIFICANT CHANGE UP (ref 96–108)
CO2 SERPL-SCNC: 20 MMOL/L — LOW (ref 22–31)
CREAT SERPL-MCNC: 1.14 MG/DL — SIGNIFICANT CHANGE UP (ref 0.5–1.3)
EGFR: 46 ML/MIN/1.73M2 — LOW
EOSINOPHIL # BLD AUTO: 0.01 K/UL — SIGNIFICANT CHANGE UP (ref 0–0.5)
EOSINOPHIL NFR BLD AUTO: 0.1 % — SIGNIFICANT CHANGE UP (ref 0–6)
GLUCOSE SERPL-MCNC: 124 MG/DL — HIGH (ref 70–99)
HCT VFR BLD CALC: 33.2 % — LOW (ref 34.5–45)
HGB BLD-MCNC: 10.9 G/DL — LOW (ref 11.5–15.5)
IMM GRANULOCYTES NFR BLD AUTO: 0.5 % — SIGNIFICANT CHANGE UP (ref 0–1.5)
LYMPHOCYTES # BLD AUTO: 13 % — SIGNIFICANT CHANGE UP (ref 13–44)
LYMPHOCYTES # BLD AUTO: 2.15 K/UL — SIGNIFICANT CHANGE UP (ref 1–3.3)
MCHC RBC-ENTMCNC: 31.8 PG — SIGNIFICANT CHANGE UP (ref 27–34)
MCHC RBC-ENTMCNC: 32.8 GM/DL — SIGNIFICANT CHANGE UP (ref 32–36)
MCV RBC AUTO: 96.8 FL — SIGNIFICANT CHANGE UP (ref 80–100)
MONOCYTES # BLD AUTO: 0.99 K/UL — HIGH (ref 0–0.9)
MONOCYTES NFR BLD AUTO: 6 % — SIGNIFICANT CHANGE UP (ref 2–14)
NEUTROPHILS # BLD AUTO: 13.24 K/UL — HIGH (ref 1.8–7.4)
NEUTROPHILS NFR BLD AUTO: 80.3 % — HIGH (ref 43–77)
NON-GYNECOLOGICAL CYTOLOGY STUDY: SIGNIFICANT CHANGE UP
NRBC # BLD: 0 /100 WBCS — SIGNIFICANT CHANGE UP (ref 0–0)
PLATELET # BLD AUTO: 168 K/UL — SIGNIFICANT CHANGE UP (ref 150–400)
POTASSIUM SERPL-MCNC: 3.8 MMOL/L — SIGNIFICANT CHANGE UP (ref 3.5–5.3)
POTASSIUM SERPL-SCNC: 3.8 MMOL/L — SIGNIFICANT CHANGE UP (ref 3.5–5.3)
PROT SERPL-MCNC: 6.1 G/DL — SIGNIFICANT CHANGE UP (ref 6–8.3)
RBC # BLD: 3.43 M/UL — LOW (ref 3.8–5.2)
RBC # FLD: 13.2 % — SIGNIFICANT CHANGE UP (ref 10.3–14.5)
SODIUM SERPL-SCNC: 133 MMOL/L — LOW (ref 135–145)
WBC # BLD: 16.49 K/UL — HIGH (ref 3.8–10.5)
WBC # FLD AUTO: 16.49 K/UL — HIGH (ref 3.8–10.5)

## 2022-08-03 PROCEDURE — 99232 SBSQ HOSP IP/OBS MODERATE 35: CPT | Mod: GC

## 2022-08-03 RX ORDER — HEPARIN SODIUM 5000 [USP'U]/ML
5000 INJECTION INTRAVENOUS; SUBCUTANEOUS EVERY 8 HOURS
Refills: 0 | Status: DISCONTINUED | OUTPATIENT
Start: 2022-08-03 | End: 2022-08-04

## 2022-08-03 RX ADMIN — Medication 2.5 MILLIGRAM(S): at 13:12

## 2022-08-03 RX ADMIN — CHLORHEXIDINE GLUCONATE 1 APPLICATION(S): 213 SOLUTION TOPICAL at 13:15

## 2022-08-03 RX ADMIN — HEPARIN SODIUM 5000 UNIT(S): 5000 INJECTION INTRAVENOUS; SUBCUTANEOUS at 13:11

## 2022-08-03 RX ADMIN — BENZOCAINE AND MENTHOL 1 LOZENGE: 5; 1 LIQUID ORAL at 05:38

## 2022-08-03 RX ADMIN — Medication 5 MILLIGRAM(S): at 05:36

## 2022-08-03 RX ADMIN — HEPARIN SODIUM 5000 UNIT(S): 5000 INJECTION INTRAVENOUS; SUBCUTANEOUS at 22:16

## 2022-08-03 NOTE — PROGRESS NOTE ADULT - SUBJECTIVE AND OBJECTIVE BOX
Interval Events:   NAEON, afebrile HD stable  S/p EUS guided FNB (8/2/22): round mass was identified in the pancreatic tail (45 mm by 32 mm) in maximal cross-sectional  diameter s/p FNA x 5 passes    Allergies:  No Known Allergies        Hospital Medications:  acetaminophen     Tablet .. 650 milliGRAM(s) Oral every 6 hours PRN  aluminum hydroxide/magnesium hydroxide/simethicone Suspension 30 milliLiter(s) Oral every 4 hours PRN  benzocaine 15 mG/menthol 3.6 mG Lozenge 1 Lozenge Oral every 3 hours PRN  chlorhexidine 2% Cloths 1 Application(s) Topical daily  enalapril 2.5 milliGRAM(s) Oral daily  guaifenesin/dextromethorphan Oral Liquid 10 milliLiter(s) Oral every 6 hours PRN  heparin   Injectable 5000 Unit(s) SubCutaneous every 8 hours  melatonin 3 milliGRAM(s) Oral at bedtime PRN  ondansetron Injectable 4 milliGRAM(s) IV Push every 8 hours PRN  sodium chloride 0.9%. 500 milliLiter(s) IV Continuous <Continuous>      PMHX/PSHX:  Anal cancer        Family history:      ROS:     General:  No wt loss, fevers, chills, night sweats, fatigue,   Eyes:  Good vision, no reported pain  ENT:  No sore throat, pain, runny nose, dysphagia  CV:  No pain, palpitations, hypo/hypertension  Pulm:  No dyspnea, cough, tachypnea, wheezing  GI: see above  :  No pain, bleeding, incontinence, nocturia  Muscle:  No pain, weakness  Neuro:  No weakness, tingling, memory problems  Psych:  No fatigue, insomnia, mood problems, depression  Endocrine:  No polyuria, polydipsia, cold/heat intolerance  Heme:  No petechiae, ecchymosis, easy bruisability  Skin:  No rash, tattoos, scars, edema      PHYSICAL EXAM:   Vital Signs:  Vital Signs Last 24 Hrs  T(C): 36.7 (03 Aug 2022 09:05), Max: 37.4 (02 Aug 2022 09:51)  T(F): 98 (03 Aug 2022 09:05), Max: 99.3 (02 Aug 2022 09:51)  HR: 75 (03 Aug 2022 09:05) (58 - 88)  BP: 111/57 (03 Aug 2022 09:05) (108/52 - 177/64)  BP(mean): --  RR: 18 (03 Aug 2022 09:05) (15 - 18)  SpO2: 98% (03 Aug 2022 09:05) (90% - 98%)    Parameters below as of 03 Aug 2022 09:05  Patient On (Oxygen Delivery Method): room air      Daily Height in cm: 152.4 (02 Aug 2022 10:33)    Daily     GENERAL:  No acute distress  HEENT:  Normocephalic/atraumatic,  no scleral icterus  CHEST:  no accessory muscle use  ABDOMEN:  Soft, non-tender, non-distended  EXTREMITIES:  No cyanosis, clubbing, or edema  SKIN:  No rash/warm/dry  NEURO:  Alert and oriented x 3    LABS:                        10.9   16.49 )-----------( 168      ( 03 Aug 2022 06:58 )             33.2     Mean Cell Volume: 96.8 fl (08-03-22 @ 06:58)    08-03    133<L>  |  100  |  30<H>  ----------------------------<  124<H>  3.8   |  20<L>  |  1.14    Ca    8.5      03 Aug 2022 07:03  Mg     1.9     08-02    TPro  6.1  /  Alb  3.4  /  TBili  1.3<H>  /  DBili  x   /  AST  24  /  ALT  9<L>  /  AlkPhos  81  08-03    LIVER FUNCTIONS - ( 03 Aug 2022 07:03 )  Alb: 3.4 g/dL / Pro: 6.1 g/dL / ALK PHOS: 81 U/L / ALT: 9 U/L / AST: 24 U/L / GGT: x           PT/INR - ( 02 Aug 2022 07:03 )   PT: 11.9 sec;   INR: 1.03 ratio         PTT - ( 02 Aug 2022 07:03 )  PTT:27.6 sec                            10.9   16.49 )-----------( 168      ( 03 Aug 2022 06:58 )             33.2                         12.1   7.39  )-----------( 185      ( 02 Aug 2022 06:59 )             37.4                         12.3   6.44  )-----------( 171      ( 01 Aug 2022 07:31 )             37.2                         12.6   7.13  )-----------( 158      ( 31 Jul 2022 13:46 )             39.1       Imaging:

## 2022-08-03 NOTE — PROGRESS NOTE ADULT - SUBJECTIVE AND OBJECTIVE BOX
CARDIOLOGY     PROGRESS  NOTE   ________________________________________________    CHIEF COMPLAINT:Patient is a 89y old  Female who presents with a chief complaint of abd pain (01 Aug 2022 12:52)  no complain.  	  REVIEW OF SYSTEMS:  CONSTITUTIONAL: No fever, weight loss, or fatigue  EYES: No eye pain, visual disturbances, or discharge  ENT:  No difficulty hearing, tinnitus, vertigo; No sinus or throat pain  NECK: No pain or stiffness  RESPIRATORY: No cough, wheezing, chills or hemoptysis; No Shortness of Breath  CARDIOVASCULAR: No chest pain, palpitations, passing out, dizziness, or leg swelling  GASTROINTESTINAL: No abdominal or epigastric pain. No nausea, vomiting, or hematemesis; No diarrhea or constipation. No melena or hematochezia.  GENITOURINARY: No dysuria, frequency, hematuria, or incontinence  NEUROLOGICAL: No headaches, memory loss, loss of strength, numbness, or tremors  SKIN: No itching, burning, rashes, or lesions   LYMPH Nodes: No enlarged glands  ENDOCRINE: No heat or cold intolerance; No hair loss  MUSCULOSKELETAL: No joint pain or swelling; No muscle, back, or extremity pain  PSYCHIATRIC: No depression, anxiety, mood swings, or difficulty sleeping  HEME/LYMPH: No easy bruising, or bleeding gums  ALLERGY AND IMMUNOLOGIC: No hives or eczema	    [ ] All others negative	  [ ] Unable to obtain    PHYSICAL EXAM:  T(C): 36.7 (08-03-22 @ 09:05), Max: 37.4 (08-02-22 @ 09:51)  HR: 75 (08-03-22 @ 09:05) (58 - 88)  BP: 111/57 (08-03-22 @ 09:05) (108/52 - 177/64)  RR: 18 (08-03-22 @ 09:05) (15 - 18)  SpO2: 98% (08-03-22 @ 09:05) (90% - 98%)  Wt(kg): --  I&O's Summary    02 Aug 2022 07:01  -  03 Aug 2022 07:00  --------------------------------------------------------  IN: 240 mL / OUT: 0 mL / NET: 240 mL        Appearance: Normal	  HEENT:   Normal oral mucosa, PERRL, EOMI	  Lymphatic: No lymphadenopathy  Cardiovascular: Normal S1 S2, No JVD, + murmurs, No edema  Respiratory: rhonchi  Psychiatry: A & O x 3, Mood & affect appropriate  Gastrointestinal:  Soft, Non-tender, + BS	  Skin: No rashes, No ecchymoses, No cyanosis	  Neurologic: Non-focal  Extremities: Normal range of motion, No clubbing, cyanosis or edema  Vascular: Peripheral pulses palpable 2+ bilaterally    MEDICATIONS  (STANDING):  chlorhexidine 2% Cloths 1 Application(s) Topical daily  enalapril 5 milliGRAM(s) Oral daily  heparin   Injectable 5000 Unit(s) SubCutaneous every 8 hours  sodium chloride 0.9%. 500 milliLiter(s) (30 mL/Hr) IV Continuous <Continuous>      TELEMETRY: 	    ECG:  	  RADIOLOGY:  OTHER: 	  	  LABS:	 	    CARDIAC MARKERS:                                10.9   16.49 )-----------( 168      ( 03 Aug 2022 06:58 )             33.2     08-03    133<L>  |  100  |  30<H>  ----------------------------<  124<H>  3.8   |  20<L>  |  1.14    Ca    8.5      03 Aug 2022 07:03  Mg     1.9     08-02    TPro  6.1  /  Alb  3.4  /  TBili  1.3<H>  /  DBili  x   /  AST  24  /  ALT  9<L>  /  AlkPhos  81  08-03    proBNP: Serum Pro-Brain Natriuretic Peptide: 247 pg/mL (07-31 @ 13:46)    Lipid Profile: Cholesterol 216  LDL --  HDL 76  TG 63    HgA1c:   TSH:   PT/INR - ( 02 Aug 2022 07:03 )   PT: 11.9 sec;   INR: 1.03 ratio         PTT - ( 02 Aug 2022 07:03 )  PTT:27.6 sec    - Plan for EUS guided biopsy of pancreatic mass + hepatic met tomorrow 8/1  - NPO after MN  - Hold PM + AM dose of lovenox  - Obtain AM labs early (3am) to avoid procedure delays    < from: [facility] Duplex Lower Ext Vein Scan, Bilat (08.01.22 @ 15:39) >  RIGHT:  Normal compressibility of the RIGHT common femoral, femoral and popliteal   veins.  Doppler examination shows normal spontaneous and phasic flow.  No RIGHT calf vein thrombosis is detected.    LEFT:  Normal compressibility of the LEFT common femoral, femoral and popliteal   veins.  Doppler examination shows normal spontaneous and phasic flow.  NoLEFT calf vein thrombosis is detected.    IMPRESSION:  No evidence of deep venous thrombosis in either lower extremity.        Assessment and plan  ---------------------------  89 year old female with a PMHx of anal cancer now resolved and no other stated medical history presenting with multiple medical problems. Patient states that she has had a bump under her left breast for the last 40 years. Over the last 2 weeks the lump has been growing. Painful at night. Associated diarrhea and loss of appetite over the last few days. Denies fevers, chest pain, difficulty breathing, abdominal pain, nausea, vomiting, changes in vision, headaches. Patient also has left lower extremity edema that she states has been present for the last 2 years and has been evaluated by her physician. She states she takes no medications.  ct scan abdomen and pelvis noted with metastatic pancreatic CA  no beta blocker sec to bradycardia  suspect possible dvt, check venous doppler  palliative care  no need for any cardiac testing  onc/ gi eval  negative venous doppler  dvt prophylaxis  decrease enalapril to 2.5 mg daily

## 2022-08-03 NOTE — PROGRESS NOTE ADULT - SUBJECTIVE AND OBJECTIVE BOX
NIKI DOMINGO  MRN-[id number]    Patient is a 89y old  Female who presents with a chief complaint of abd pain (01 Aug 2022 12:52)      Review of System  REVIEW OF SYSTEMS      General:	Denies fatigue, fevers, chills, sweats, decreased appetite.    Skin/Breast: denies pruritis, rash  	  Ophthalmologic: no change in vision or blurring  	  HEENT	Denies dry mouth, oral sores, dysphagia,  change in hearing.    Respiratory and Thorax:  cough, sob, wheeze, hemoptysis  	  Cardiovascular:	no cp , palp, orthopnea    Gastrointestinal:	no n/v/d constipation    Genitourinary:	no dysuria of frequency, no hematuria, no flank pain    Musculoskeletal:	no bone or joint pain. no muscle aches.     Neurological:	no change in sensory or motor function. no headache. no weakness.     Psychiatric:	no depression, no anxiety, insomnia.     Hematology/Lymphatics:	no bleeding or bruising        Current Meds  MEDICATIONS  (STANDING):  chlorhexidine 2% Cloths 1 Application(s) Topical daily  enalapril 5 milliGRAM(s) Oral daily  sodium chloride 0.9%. 500 milliLiter(s) (30 mL/Hr) IV Continuous <Continuous>    MEDICATIONS  (PRN):  acetaminophen     Tablet .. 650 milliGRAM(s) Oral every 6 hours PRN Temp greater or equal to 38C (100.4F), Mild Pain (1 - 3)  aluminum hydroxide/magnesium hydroxide/simethicone Suspension 30 milliLiter(s) Oral every 4 hours PRN Dyspepsia  benzocaine 15 mG/menthol 3.6 mG Lozenge 1 Lozenge Oral every 3 hours PRN Sore Throat  guaifenesin/dextromethorphan Oral Liquid 10 milliLiter(s) Oral every 6 hours PRN Cough  melatonin 3 milliGRAM(s) Oral at bedtime PRN Insomnia  ondansetron Injectable 4 milliGRAM(s) IV Push every 8 hours PRN Nausea and/or Vomiting      Vitals  Vital Signs Last 24 Hrs  T(C): 37.2 (03 Aug 2022 00:02), Max: 37.4 (02 Aug 2022 09:51)  T(F): 99 (03 Aug 2022 00:02), Max: 99.3 (02 Aug 2022 09:51)  HR: 77 (03 Aug 2022 00:02) (58 - 88)  BP: 108/52 (03 Aug 2022 00:02) (108/52 - 177/64)  BP(mean): --  RR: 18 (03 Aug 2022 00:02) (15 - 18)  SpO2: 95% (03 Aug 2022 00:02) (90% - 98%)    Parameters below as of 03 Aug 2022 00:02  Patient On (Oxygen Delivery Method): room air        Physical Exam  PHYSICAL EXAM:      Constitutional: NAD    Eyes: PERRLA EOMI, anicteric sclera    Heent :No oral sores, no pharyngeal injection. moist mucosa.    Neck: supple, no jvd, no LAD    Respiratory: CTA b/l     Cardiovascular: s1s2, no m/g/r    Gastrointestinal: soft, nt, nd, + BS    Extremities: no c/c/e    Neurological:A&O x 3 moves all ext.    Skin: no rash on exposed skin    Lymph Nodes: no lymphadenopathy.              Lab  CBC Full  -  ( 02 Aug 2022 06:59 )  WBC Count : 7.39 K/uL  RBC Count : 3.83 M/uL  Hemoglobin : 12.1 g/dL  Hematocrit : 37.4 %  Platelet Count - Automated : 185 K/uL  Mean Cell Volume : 97.7 fl  Mean Cell Hemoglobin : 31.6 pg  Mean Cell Hemoglobin Concentration : 32.4 gm/dL  Auto Neutrophil # : x  Auto Lymphocyte # : x  Auto Monocyte # : x  Auto Eosinophil # : x  Auto Basophil # : x  Auto Neutrophil % : x  Auto Lymphocyte % : x  Auto Monocyte % : x  Auto Eosinophil % : x  Auto Basophil % : x    08-02    133<L>  |  99  |  28<H>  ----------------------------<  110<H>  4.0   |  22  |  1.13    Ca    9.1      02 Aug 2022 07:02  Mg     1.9     08-02    TPro  7.3  /  Alb  4.1  /  TBili  0.9  /  DBili  x   /  AST  16  /  ALT  8<L>  /  AlkPhos  107  08-01    PT/INR - ( 02 Aug 2022 07:03 )   PT: 11.9 sec;   INR: 1.03 ratio         PTT - ( 02 Aug 2022 07:03 )  PTT:27.6 sec    Rad:    Assessment/Plan   NIKI DOMINGO  MRN-[id number]    Patient is a 89y old  Female who presents with a chief complaint of abd pain (01 Aug 2022 12:52)      Review of System    Pt is comfortable at the moment. she had some trouble with sore throat last night after procedure. Now better. No HA or dizziness. No f/c/s. No cp or palp.  No cough or sob. no n/v/d. No dysuria.     Current Meds  MEDICATIONS  (STANDING):  chlorhexidine 2% Cloths 1 Application(s) Topical daily  enalapril 5 milliGRAM(s) Oral daily  sodium chloride 0.9%. 500 milliLiter(s) (30 mL/Hr) IV Continuous <Continuous>    MEDICATIONS  (PRN):  acetaminophen     Tablet .. 650 milliGRAM(s) Oral every 6 hours PRN Temp greater or equal to 38C (100.4F), Mild Pain (1 - 3)  aluminum hydroxide/magnesium hydroxide/simethicone Suspension 30 milliLiter(s) Oral every 4 hours PRN Dyspepsia  benzocaine 15 mG/menthol 3.6 mG Lozenge 1 Lozenge Oral every 3 hours PRN Sore Throat  guaifenesin/dextromethorphan Oral Liquid 10 milliLiter(s) Oral every 6 hours PRN Cough  melatonin 3 milliGRAM(s) Oral at bedtime PRN Insomnia  ondansetron Injectable 4 milliGRAM(s) IV Push every 8 hours PRN Nausea and/or Vomiting    Vital Signs Last 24 Hrs  T(C): 37.2 (03 Aug 2022 00:02), Max: 37.4 (02 Aug 2022 09:51)  T(F): 99 (03 Aug 2022 00:02), Max: 99.3 (02 Aug 2022 09:51)  HR: 77 (03 Aug 2022 00:02) (58 - 88)  BP: 108/52 (03 Aug 2022 00:02) (108/52 - 177/64)  BP(mean): --  RR: 18 (03 Aug 2022 00:02) (15 - 18)  SpO2: 95% (03 Aug 2022 00:02) (90% - 98%)    Parameters below as of 03 Aug 2022 00:02  Patient On (Oxygen Delivery Method): room air      PHYSICAL EXAM:    Constitutional: NAD    Eyes: PERRLA EOMI, anicteric sclera    Heent :No oral sores, no pharyngeal injection. moist mucosa.    Neck: supple, no jvd, no LAD    Respiratory: CTA b/l     Cardiovascular: s1s2, no m/g/r    Gastrointestinal: soft, nt, nd, + BS    Extremities: no c/c/e    Neurological:A&O x 3 moves all ext.    Skin: no rash on exposed skin    Lymph Nodes: no lymphadenopathy.    Lab  CBC Full  -  ( 02 Aug 2022 06:59 )  WBC Count : 7.39 K/uL  RBC Count : 3.83 M/uL  Hemoglobin : 12.1 g/dL  Hematocrit : 37.4 %  Platelet Count - Automated : 185 K/uL  Mean Cell Volume : 97.7 fl  Mean Cell Hemoglobin : 31.6 pg  Mean Cell Hemoglobin Concentration : 32.4 gm/dL  Auto Neutrophil # : x  Auto Lymphocyte # : x  Auto Monocyte # : x  Auto Eosinophil # : x  Auto Basophil # : x  Auto Neutrophil % : x  Auto Lymphocyte % : x  Auto Monocyte % : x  Auto Eosinophil % : x  Auto Basophil % : x    08-02    133<L>  |  99  |  28<H>  ----------------------------<  110<H>  4.0   |  22  |  1.13    Ca    9.1      02 Aug 2022 07:02  Mg     1.9     08-02    TPro  7.3  /  Alb  4.1  /  TBili  0.9  /  DBili  x   /  AST  16  /  ALT  8<L>  /  AlkPhos  107  08-01    PT/INR - ( 02 Aug 2022 07:03 )   PT: 11.9 sec;   INR: 1.03 ratio         PTT - ( 02 Aug 2022 07:03 )  PTT:27.6 sec    Rad:    Assessment/Plan

## 2022-08-03 NOTE — PROGRESS NOTE ADULT - ASSESSMENT
pancreatic mass with possible liver metastases.   s/p EUS with bx of pancreatic mass. Pathology is pending.  Will likely need a liver biopsy as well.   Can consider outpt PET scan to further evaluate.   Chest CT sub cm lung nodules.   LE doppler negative for DVT.  pancreatic mass with possible liver metastases.   s/p EUS with bx of pancreatic mass. Pathology is pending.  Will likely need a liver biopsy as well.   Can consider outpt PET scan to further evaluate.   Chest CT sub cm lung nodules.   LE doppler negative for DVT.  Onc mgmt as outpt, pending path results.

## 2022-08-03 NOTE — PROGRESS NOTE ADULT - ATTENDING COMMENTS
As above    Impression:    #1.  Pancreatic tail mass s/p EUS/FNB 8/2/22  #2.  Liver lesions    Recommendation:    #1.  Await pathology results  #2. Oncology followup  #3.  If liver lesion sampling is desired after #1 and #2, would consult interventional radiology.

## 2022-08-03 NOTE — PROGRESS NOTE ADULT - ASSESSMENT
89F Hx of anal cancer s/p chemo/RT (2018) p/w abd pain, weight loss, LE swelling. Found to have new pancreatic mass + c/f hepatic met on imaging for which [facility] is consulted regarding obtaining a biopsy.     Impression:   #Pancreatic mass: CT a/p (7/31): infiltrative mass in the pancreatic tail (2:26) 4.9 x 4.5 cm s/p EUS guided FNA (8/2)  Less than 180 degrees involvement of the SMV.. Evaluation for arterial involvement is limited at single phase imaging. Currently symptomatic with epigastric/left sided abd pain + weight loss without e/o biliary obstruction (normal bile ducts + Tbili)     #Hepatic mass: CT a/p (7/31) A hypodense lesion in hepatic segment IVb (2:38) 2.5 x 2.1 cm)   concerning for metastasis. A 1.1 x 0.9 cm right hepatic lobe lesion (2:19) is indeterminate. Additional hepatic cysts and subcentimeter foci   too small to characterize.      Recommendations:   - Resume previous diet.   - Await cytology results and await path results.  - Follow up with oncology. If liver lesion seen on CT requires sampling, recommend IR evaluation.        Thank you for involving us in the care of this patient, please reach out if any further questions.     Eric Reardon MD  Gastroenterology/Hepatology Fellow, PGY6    Available on [employer] Teams  [phone] ([facility])  [id number] ([facility])  Please contact on call fellow weekdays after 5pm-7am and weekends: [phone]

## 2022-08-03 NOTE — PROGRESS NOTE ADULT - ASSESSMENT
_________________________________________________________________________________________  ========>>  M E D I C A L   A T T E N D I N G    F O L L O W  U P  N O T E  <<=========  -----------------------------------------------------------------------------------------------------    - Patient seen and examined by me earlier today.   - In summary,  NIKI DOMINGO is a 89y year old woman admitted with mass   - Patient today overall doing ok, comfortable, pt reports last night had drenching seats with low BP..        otherwise today ok, eating ok, comfortable     ==================>> REVIEW OF SYSTEM <<=================    GEN: no fever, no chills, no pain, as above   RESP: no SOB, occasional cough, no sputum  CVS: no chest pain, no palpitations, no edema  GI: no abdominal pain, no nausea,  some diarrhea  : no dysuria, no frequency, no hematuria  Neuro: no headache, no dizziness  Derm : no itching, no rash    ==================>> PHYSICAL EXAM <<=================    GEN: A&O X 3 , NAD , comfortable, pleasant, calm , cachectic , resting in bed   HEENT: NCAT, PERRL, MMM, hearing intact  Neck: supple , no JVD appreciated  CVS: S1S2 , regular , No M/R/G appreciated  PULM: CTA B/L,  no W/R/R appreciated  ABD.: soft. non tender, non distended,  bowel sounds present  Extrem: intact pulses , no edema   PSYCH : normal mood,  not anxious                             ( Note written / Date of service 08-03-22 )    ==================>> MEDICATIONS <<====================    chlorhexidine 2% Cloths 1 Application(s) Topical daily  enalapril 2.5 milliGRAM(s) Oral daily  heparin   Injectable 5000 Unit(s) SubCutaneous every 8 hours  sodium chloride 0.9%. 500 milliLiter(s) IV Continuous <Continuous>    MEDICATIONS  (PRN):  acetaminophen     Tablet .. 650 milliGRAM(s) Oral every 6 hours PRN Temp greater or equal to 38C (100.4F), Mild Pain (1 - 3)  aluminum hydroxide/magnesium hydroxide/simethicone Suspension 30 milliLiter(s) Oral every 4 hours PRN Dyspepsia  benzocaine 15 mG/menthol 3.6 mG Lozenge 1 Lozenge Oral every 3 hours PRN Sore Throat  guaifenesin/dextromethorphan Oral Liquid 10 milliLiter(s) Oral every 6 hours PRN Cough  melatonin 3 milliGRAM(s) Oral at bedtime PRN Insomnia  ondansetron Injectable 4 milliGRAM(s) IV Push every 8 hours PRN Nausea and/or Vomiting    ___________  Active diet:  Diet, Regular  ___________________    ==================>> VITAL SIGNS <<==================    Height (cm): 152.4  Weight (kg): 54.4  BMI (kg/m2): 23.4  Vital Signs Last 24 HrsT(C): 36.4 (08-03-22 @ 16:05)  T(F): 97.5 (08-03-22 @ 16:05), Max: 99 (08-03-22 @ 00:02)  HR: 63 (08-03-22 @ 16:05) (63 - 77)  BP: 138/62 (08-03-22 @ 16:05)  RR: 18 (08-03-22 @ 16:05) (18 - 18)  SpO2: 98% (08-03-22 @ 16:05) (95% - 98%)       ==================>> LAB AND IMAGING <<==================                        10.9   16.49 )-----------( 168      ( 03 Aug 2022 06:58 )             33.2        08-03    133<L>  |  100  |  30<H>  ----------------------------<  124<H>  3.8   |  20<L>  |  1.14    Ca    8.5      03 Aug 2022 07:03  Mg     1.9     08-02    TPro  6.1  /  Alb  3.4  /  TBili  1.3<H>  /  DBili  x   /  AST  24  /  ALT  9<L>  /  AlkPhos  81  08-03    WBC count:   16.49 <<== ,  7.39 <<== ,  6.44 <<== ,  7.13 <<==   Hemoglobin:   10.9 <<==,  12.1 <<==,  12.3 <<==,  12.6 <<==  platelets:  168 <==, 185 <==, 171 <==, 158 <==    Creatinine:  1.14  <<==, 1.13  <<==, 0.81  <<==, 0.78  <<==  Sodium:   133  <==, 133  <==, 140  <==, 138  <==       AST:          24 <== , 16 <== , 33 <==      ALT:        9  <== , 8  <== , 11  <==      AP:        81  <=, 107  <=, 114  <=     Bili:        1.3  <=, 0.9  <=, 0.8  <=      < from: CT Abdomen and Pelvis w/ IV Cont (07.31.22 @ 14:52) >  IMPRESSION:  Pancreatic tail mass with vascular involvement and suspected hepatic  metastatic disease.  A 1.5 cm cystic focus in the anterior left lower quadrants, etiology   unclear. This may be a benign cyst, less likely peritoneal disease.  < end of copied text >    < from: CT Chest No Cont (07.31.22 @ 23:21) >  IMPRESSION:  Bilateral sub-3 mm nodules.  < end of copied text >    < from: [facility] Duplex Lower Ext Vein Scan, Bilat (08.01.22 @ 15:39) >  IMPRESSION:  No evidence of deep venous thrombosis in either lower extremity.  < end of copied text >    ___________________________________________________________________________________  ===============>>  A S S E S S M E N T   A N D   P L A N <<===============  ------------------------------------------------------------------------------------------    · Assessment	  Patient is a 88yo Female with past medical history of anal cancer in 2018 post Chemo and RT, chronic leg swelling, presenting with abdominal discomfort.   Patient states that she has had a lump under her left breast for the last 40 years. Over the last 2 weeks the lump has been growing. Painful / uncomfortable at night.   also with c/o of on and off diarrhea and loss of appetite over the last few days.   Denies fevers, chest pain, difficulty breathing, abdominal pain, nausea, vomiting, changes in weight.  Patient also has left lower extremity edema that she states has been present for the last 2 years and has been evaluated by her physician. She states she takes no medications  in ED pt found to have likely metastatic pancreatic cancer      Problem/Plan - 1:  ·  Problem: Pancreatic mass.   ·  Plan: likely pancreatic cancer with mets  CEA elevated , CA 19-9 negative !!!   CT as noted  CT chest and duplex as above   onc appreciated  post EUS today / FNA Bx  follow path     Problem/Plan - 2:  ·  Problem: Bradycardia.   ·  Plan: monitor for now avoid AV deb blocker  cardio appreciated  nutrition   monitor.    Problem/Plan - 3:  ·  Problem: Non-infective diarrhea.   ·  Plan: unclear etiology of diarrhea  not appear to be significant based on history  appears improved   diet as tolerated >> encourage PO intake given hyponatremia     Problem/Plan - 4:  ·  Problem: Leg edema. >> improved   ·  Plan: chronic  Duplex negative for DVT.  protein intake  leg elevation     Problem/Plan - 5:  ·  Problem: Leukocytosis post procedure   likely reactive but given episode of seating and low BP last night : monitor closely for now     if febrile >> blood cultures     repeat CBC + diff in AM     Problem/Plan - 4:  ·  Problem: hyponatremia    encourage Po intake     monitor       -GI/DVT Prophylaxis per protocol.    possible DC planing home in 24hrs if stable with OP f/u with onc     --------------------------------------------  Case discussed with pt,  NP  Education given on findings and plan of care    I had a prolonged conversation with the patient, NIKI DOMINGO, kamille diagnosis, findings, and plan of care, options, alternatives, prognosis... . Patient verbalized clear understanding, all questions answered.  ___________________________  H. RAMONITA Lucero.  Pager: [phone]

## 2022-08-04 ENCOUNTER — TRANSCRIPTION ENCOUNTER (OUTPATIENT)
Age: 87
End: 2022-08-04

## 2022-08-04 VITALS
RESPIRATION RATE: 18 BRPM | TEMPERATURE: 98 F | OXYGEN SATURATION: 98 % | DIASTOLIC BLOOD PRESSURE: 60 MMHG | HEART RATE: 61 BPM | SYSTOLIC BLOOD PRESSURE: 129 MMHG

## 2022-08-04 DIAGNOSIS — I16.0 HYPERTENSIVE URGENCY: ICD-10-CM

## 2022-08-04 LAB
ALBUMIN SERPL ELPH-MCNC: 3.3 G/DL — SIGNIFICANT CHANGE UP (ref 3.3–5)
ALP SERPL-CCNC: 85 U/L — SIGNIFICANT CHANGE UP (ref 40–120)
ALT FLD-CCNC: 14 U/L — SIGNIFICANT CHANGE UP (ref 10–45)
ANION GAP SERPL CALC-SCNC: 13 MMOL/L — SIGNIFICANT CHANGE UP (ref 5–17)
AST SERPL-CCNC: 23 U/L — SIGNIFICANT CHANGE UP (ref 10–40)
BASOPHILS # BLD AUTO: 0.02 K/UL — SIGNIFICANT CHANGE UP (ref 0–0.2)
BASOPHILS NFR BLD AUTO: 0.2 % — SIGNIFICANT CHANGE UP (ref 0–2)
BILIRUB SERPL-MCNC: 0.6 MG/DL — SIGNIFICANT CHANGE UP (ref 0.2–1.2)
BUN SERPL-MCNC: 32 MG/DL — HIGH (ref 7–23)
CALCIUM SERPL-MCNC: 9 MG/DL — SIGNIFICANT CHANGE UP (ref 8.4–10.5)
CHLORIDE SERPL-SCNC: 103 MMOL/L — SIGNIFICANT CHANGE UP (ref 96–108)
CO2 SERPL-SCNC: 23 MMOL/L — SIGNIFICANT CHANGE UP (ref 22–31)
CREAT SERPL-MCNC: 1.05 MG/DL — SIGNIFICANT CHANGE UP (ref 0.5–1.3)
EGFR: 51 ML/MIN/1.73M2 — LOW
EOSINOPHIL # BLD AUTO: 0.16 K/UL — SIGNIFICANT CHANGE UP (ref 0–0.5)
EOSINOPHIL NFR BLD AUTO: 1.8 % — SIGNIFICANT CHANGE UP (ref 0–6)
GLUCOSE SERPL-MCNC: 118 MG/DL — HIGH (ref 70–99)
HCT VFR BLD CALC: 32.7 % — LOW (ref 34.5–45)
HGB BLD-MCNC: 10.6 G/DL — LOW (ref 11.5–15.5)
IMM GRANULOCYTES NFR BLD AUTO: 0.3 % — SIGNIFICANT CHANGE UP (ref 0–1.5)
LYMPHOCYTES # BLD AUTO: 2.17 K/UL — SIGNIFICANT CHANGE UP (ref 1–3.3)
LYMPHOCYTES # BLD AUTO: 24.8 % — SIGNIFICANT CHANGE UP (ref 13–44)
MAGNESIUM SERPL-MCNC: 2 MG/DL — SIGNIFICANT CHANGE UP (ref 1.6–2.6)
MCHC RBC-ENTMCNC: 31.2 PG — SIGNIFICANT CHANGE UP (ref 27–34)
MCHC RBC-ENTMCNC: 32.4 GM/DL — SIGNIFICANT CHANGE UP (ref 32–36)
MCV RBC AUTO: 96.2 FL — SIGNIFICANT CHANGE UP (ref 80–100)
MONOCYTES # BLD AUTO: 0.76 K/UL — SIGNIFICANT CHANGE UP (ref 0–0.9)
MONOCYTES NFR BLD AUTO: 8.7 % — SIGNIFICANT CHANGE UP (ref 2–14)
NEUTROPHILS # BLD AUTO: 5.61 K/UL — SIGNIFICANT CHANGE UP (ref 1.8–7.4)
NEUTROPHILS NFR BLD AUTO: 64.2 % — SIGNIFICANT CHANGE UP (ref 43–77)
NRBC # BLD: 0 /100 WBCS — SIGNIFICANT CHANGE UP (ref 0–0)
PHOSPHATE SERPL-MCNC: 2.4 MG/DL — LOW (ref 2.5–4.5)
PLATELET # BLD AUTO: 147 K/UL — LOW (ref 150–400)
POTASSIUM SERPL-MCNC: 3.9 MMOL/L — SIGNIFICANT CHANGE UP (ref 3.5–5.3)
POTASSIUM SERPL-SCNC: 3.9 MMOL/L — SIGNIFICANT CHANGE UP (ref 3.5–5.3)
PROT SERPL-MCNC: 6.3 G/DL — SIGNIFICANT CHANGE UP (ref 6–8.3)
RBC # BLD: 3.4 M/UL — LOW (ref 3.8–5.2)
RBC # FLD: 13.3 % — SIGNIFICANT CHANGE UP (ref 10.3–14.5)
SODIUM SERPL-SCNC: 139 MMOL/L — SIGNIFICANT CHANGE UP (ref 135–145)
WBC # BLD: 8.75 K/UL — SIGNIFICANT CHANGE UP (ref 3.8–10.5)
WBC # FLD AUTO: 8.75 K/UL — SIGNIFICANT CHANGE UP (ref 3.8–10.5)

## 2022-08-04 PROCEDURE — 74177 CT ABD & PELVIS W/CONTRAST: CPT | Mod: MA

## 2022-08-04 PROCEDURE — 80048 BASIC METABOLIC PNL TOTAL CA: CPT

## 2022-08-04 PROCEDURE — 84100 ASSAY OF PHOSPHORUS: CPT

## 2022-08-04 PROCEDURE — 80061 LIPID PANEL: CPT

## 2022-08-04 PROCEDURE — 88307 TISSUE EXAM BY PATHOLOGIST: CPT

## 2022-08-04 PROCEDURE — 88173 CYTOPATH EVAL FNA REPORT: CPT

## 2022-08-04 PROCEDURE — 83880 ASSAY OF NATRIURETIC PEPTIDE: CPT

## 2022-08-04 PROCEDURE — 85730 THROMBOPLASTIN TIME PARTIAL: CPT

## 2022-08-04 PROCEDURE — 85025 COMPLETE CBC W/AUTO DIFF WBC: CPT

## 2022-08-04 PROCEDURE — 83690 ASSAY OF LIPASE: CPT

## 2022-08-04 PROCEDURE — 99285 EMERGENCY DEPT VISIT HI MDM: CPT | Mod: 25

## 2022-08-04 PROCEDURE — 80053 COMPREHEN METABOLIC PANEL: CPT

## 2022-08-04 PROCEDURE — U0003: CPT

## 2022-08-04 PROCEDURE — 85610 PROTHROMBIN TIME: CPT

## 2022-08-04 PROCEDURE — 85027 COMPLETE CBC AUTOMATED: CPT

## 2022-08-04 PROCEDURE — 93970 EXTREMITY STUDY: CPT

## 2022-08-04 PROCEDURE — 83735 ASSAY OF MAGNESIUM: CPT

## 2022-08-04 PROCEDURE — 82378 CARCINOEMBRYONIC ANTIGEN: CPT

## 2022-08-04 PROCEDURE — U0005: CPT

## 2022-08-04 PROCEDURE — 88172 CYTP DX EVAL FNA 1ST EA SITE: CPT

## 2022-08-04 PROCEDURE — 86301 IMMUNOASSAY TUMOR CA 19-9: CPT

## 2022-08-04 PROCEDURE — 36415 COLL VENOUS BLD VENIPUNCTURE: CPT

## 2022-08-04 PROCEDURE — 88305 TISSUE EXAM BY PATHOLOGIST: CPT

## 2022-08-04 PROCEDURE — 71250 CT THORAX DX C-: CPT

## 2022-08-04 PROCEDURE — 93005 ELECTROCARDIOGRAM TRACING: CPT

## 2022-08-04 RX ADMIN — HEPARIN SODIUM 5000 UNIT(S): 5000 INJECTION INTRAVENOUS; SUBCUTANEOUS at 06:02

## 2022-08-04 RX ADMIN — Medication 2.5 MILLIGRAM(S): at 06:01

## 2022-08-04 NOTE — PROGRESS NOTE ADULT - ASSESSMENT
M E D I C A L   A T T E N D I N G    F O L L O W    U P   N O T E  (08-04-22 )                                     ------------------------------------------------------------------------------------------------    patient evaluated by me, case discussed with team, chart, medications, and physical exam reviewed, labs / tests  and vitals reviewed by me, as bellow.   Patient is stable for discharge today.  Patient to follow up with  prior oncologist in [place]... I had a long discussion with pt re different options , including seeing onc-Sx here, following with oncology as OP and getting PET, liver Bx or Sx as recommended...        pt chose to be discharged home today and follow up with her prior oncologist in [place] ( pt has number and will make appointment )  for further mgmt ..     I discussed above with Dr العراقي and oncologist from [facility]   See discharge document for full note.  Greater than 35 min spent for these services.                              ( note written / Date of service  08-04-22 )    ==================>> MEDICATIONS <<====================    chlorhexidine 2% Cloths 1 Application(s) Topical daily  enalapril 2.5 milliGRAM(s) Oral daily  heparin   Injectable 5000 Unit(s) SubCutaneous every 8 hours  sodium chloride 0.9%. 500 milliLiter(s) IV Continuous <Continuous>    MEDICATIONS  (PRN):  acetaminophen     Tablet .. 650 milliGRAM(s) Oral every 6 hours PRN Temp greater or equal to 38C (100.4F), Mild Pain (1 - 3)  aluminum hydroxide/magnesium hydroxide/simethicone Suspension 30 milliLiter(s) Oral every 4 hours PRN Dyspepsia  benzocaine 15 mG/menthol 3.6 mG Lozenge 1 Lozenge Oral every 3 hours PRN Sore Throat  guaifenesin/dextromethorphan Oral Liquid 10 milliLiter(s) Oral every 6 hours PRN Cough  melatonin 3 milliGRAM(s) Oral at bedtime PRN Insomnia  ondansetron Injectable 4 milliGRAM(s) IV Push every 8 hours PRN Nausea and/or Vomiting    ___________  Active diet:  Diet, Regular  ___________________    ==================>> VITAL SIGNS <<==================    T(C): 36.4 (08-04-22 @ 00:15), Max: 36.8 (08-03-22 @ 21:26)  HR: 71 (08-04-22 @ 00:15) (63 - 71)  BP: 143/63 (08-04-22 @ 00:15) (138/62 - 148/61)  RR: 18 (08-04-22 @ 00:15) (18 - 18)  SpO2: 96% (08-04-22 @ 00:15) (96% - 98%)       I&O's Summary    03 Aug 2022 07:01  -  04 Aug 2022 07:00  --------------------------------------------------------  IN: 250 mL / OUT: 0 mL / NET: 250 mL       ==================>> LAB AND IMAGING <<==================                        10.6   8.75  )-----------( 147      ( 04 Aug 2022 06:47 )             32.7        08-04    139  |  103  |  32<H>  ----------------------------<  118<H>  3.9   |  23  |  1.05    Ca    9.0      04 Aug 2022 06:46  Phos  2.4     08-04  Mg     2.0     08-04    TPro  6.3  /  Alb  3.3  /  TBili  0.6  /  DBili  x   /  AST  23  /  ALT  14  /  AlkPhos  85  08-04      Lipid profile:  (08-01-22)     Total: 216     LDL  : (p)     HDL  :76     TG   :63     HgA1C:   WBC count:   8.75 <<== ,  16.49 <<== ,  7.39 <<== ,  6.44 <<== ,  7.13 <<==   Hemoglobin:   10.6 <<==,  10.9 <<==,  12.1 <<==,  12.3 <<==,  12.6 <<==  platelets:  147 <==, 168 <==, 185 <==, 171 <==, 158 <==    Creatinine:  1.05  <<==, 1.14  <<==, 1.13  <<==, 0.81  <<==, 0.78  <<==  Sodium:   139  <==, 133  <==, 133  <==, 140  <==, 138  <==       AST:          23 <== , 24 <== , 16 <== , 33 <==      ALT:        14  <== , 9  <== , 8  <== , 11  <==      AP:        85  <=, 81  <=, 107  <=, 114  <=     Bili:        0.6  <=, 1.3  <=, 0.9  <=, 0.8  <=

## 2022-08-04 NOTE — DISCHARGE NOTE PROVIDER - NSDCFUADDAPPT_GEN_ALL_CORE_FT
APPTS ARE READY TO BE MADE: [X] YES    Best Family or Patient Contact (if needed):    Additional Information about above appointments (if needed):    1:   2:   3:     Other comments or requests:    APPTS ARE READY TO BE MADE: [X] YES    Best Family or Patient Contact (if needed):    Additional Information about above appointments (if needed):    1:   2:   3:     Other comments or requests:     Patient was provided with Dr. Verdin, Dr. Drew, and Dr. العراقي and was advised to call to schedule follow up within specified time frame. At this time patient declined scheduling assistance.

## 2022-08-04 NOTE — DISCHARGE NOTE PROVIDER - NSDCCPCAREPLAN_GEN_ALL_CORE_FT
PRINCIPAL DISCHARGE DIAGNOSIS  Diagnosis: Pancreatic mass  Assessment and Plan of Treatment: Seen on CT scan of your abdomen. Follow up with our Oncology Physicians as recommended to discuss further evaluation and treatment.      SECONDARY DISCHARGE DIAGNOSES  Diagnosis: Bradycardia  Assessment and Plan of Treatment: Follow up with Cardiology for further testing if Surgery is required.     PRINCIPAL DISCHARGE DIAGNOSIS  Diagnosis: Pancreatic mass  Assessment and Plan of Treatment: Seen on CT scan of your abdomen. Follow up with our Oncology Physicians as recommended ( Dr Benjamin) to discuss further evaluation and treatment.      SECONDARY DISCHARGE DIAGNOSES  Diagnosis: Hypertensive urgency  Assessment and Plan of Treatment: resolved, cont enalapril as ordered, f/up with PCP    Diagnosis: Bradycardia  Assessment and Plan of Treatment: Follow up with Cardiology for further testing if Surgery is required.

## 2022-08-04 NOTE — DISCHARGE NOTE NURSING/CASE MANAGEMENT/SOCIAL WORK - PATIENT PORTAL LINK FT
You can access the FollowMyHealth Patient Portal offered by [facility] by registering at the following website: [email or web address]. By joining [facility]’s FollowMyHealth portal, you will also be able to view your health information using other applications (apps) compatible with our system.

## 2022-08-04 NOTE — PROGRESS NOTE ADULT - SUBJECTIVE AND OBJECTIVE BOX
NIKI DOMINGO  MRN-[id number]    Patient is a 89y old  Female who presents with a chief complaint of abd pain (01 Aug 2022 12:52)      Review of System  REVIEW OF SYSTEMS      General:	Denies fatigue, fevers, chills, sweats, decreased appetite.    Skin/Breast: denies pruritis, rash  	  Ophthalmologic: no change in vision or blurring  	  HEENT	Denies dry mouth, oral sores, dysphagia,  change in hearing.    Respiratory and Thorax:  cough, sob, wheeze, hemoptysis  	  Cardiovascular:	no cp , palp, orthopnea    Gastrointestinal:	no n/v/d constipation    Genitourinary:	no dysuria of frequency, no hematuria, no flank pain    Musculoskeletal:	no bone or joint pain. no muscle aches.     Neurological:	no change in sensory or motor function. no headache. no weakness.     Psychiatric:	no depression, no anxiety, insomnia.     Hematology/Lymphatics:	no bleeding or bruising        Current Meds  MEDICATIONS  (STANDING):  chlorhexidine 2% Cloths 1 Application(s) Topical daily  enalapril 2.5 milliGRAM(s) Oral daily  heparin   Injectable 5000 Unit(s) SubCutaneous every 8 hours  sodium chloride 0.9%. 500 milliLiter(s) (30 mL/Hr) IV Continuous <Continuous>    MEDICATIONS  (PRN):  acetaminophen     Tablet .. 650 milliGRAM(s) Oral every 6 hours PRN Temp greater or equal to 38C (100.4F), Mild Pain (1 - 3)  aluminum hydroxide/magnesium hydroxide/simethicone Suspension 30 milliLiter(s) Oral every 4 hours PRN Dyspepsia  benzocaine 15 mG/menthol 3.6 mG Lozenge 1 Lozenge Oral every 3 hours PRN Sore Throat  guaifenesin/dextromethorphan Oral Liquid 10 milliLiter(s) Oral every 6 hours PRN Cough  melatonin 3 milliGRAM(s) Oral at bedtime PRN Insomnia  ondansetron Injectable 4 milliGRAM(s) IV Push every 8 hours PRN Nausea and/or Vomiting      Vitals  Vital Signs Last 24 Hrs  T(C): 36.4 (04 Aug 2022 00:15), Max: 36.8 (03 Aug 2022 21:26)  T(F): 97.5 (04 Aug 2022 00:15), Max: 98.2 (03 Aug 2022 21:26)  HR: 71 (04 Aug 2022 00:15) (63 - 75)  BP: 143/63 (04 Aug 2022 00:15) (111/57 - 148/61)  BP(mean): --  RR: 18 (04 Aug 2022 00:15) (18 - 18)  SpO2: 96% (04 Aug 2022 00:15) (96% - 98%)    Parameters below as of 04 Aug 2022 00:15  Patient On (Oxygen Delivery Method): room air        Physical Exam  PHYSICAL EXAM:      Constitutional: NAD    Eyes: PERRLA EOMI, anicteric sclera    Heent :No oral sores, no pharyngeal injection. moist mucosa.    Neck: supple, no jvd, no LAD    Respiratory: CTA b/l     Cardiovascular: s1s2, no m/g/r    Gastrointestinal: soft, nt, nd, + BS    Extremities: no c/c/e    Neurological:A&O x 3 moves all ext.    Skin: no rash on exposed skin    Lymph Nodes: no lymphadenopathy.      Lab  CBC Full  -  ( 03 Aug 2022 06:58 )  WBC Count : 16.49 K/uL  RBC Count : 3.43 M/uL  Hemoglobin : 10.9 g/dL  Hematocrit : 33.2 %  Platelet Count - Automated : 168 K/uL  Mean Cell Volume : 96.8 fl  Mean Cell Hemoglobin : 31.8 pg  Mean Cell Hemoglobin Concentration : 32.8 gm/dL  Auto Neutrophil # : 13.24 K/uL  Auto Lymphocyte # : 2.15 K/uL  Auto Monocyte # : 0.99 K/uL  Auto Eosinophil # : 0.01 K/uL  Auto Basophil # : 0.02 K/uL  Auto Neutrophil % : 80.3 %  Auto Lymphocyte % : 13.0 %  Auto Monocyte % : 6.0 %  Auto Eosinophil % : 0.1 %  Auto Basophil % : 0.1 %    08-03    133<L>  |  100  |  30<H>  ----------------------------<  124<H>  3.8   |  20<L>  |  1.14    Ca    8.5      03 Aug 2022 07:03    TPro  6.1  /  Alb  3.4  /  TBili  1.3<H>  /  DBili  x   /  AST  24  /  ALT  9<L>  /  AlkPhos  81  08-03        Rad:    Assessment/Plan   NIKI DOMINGO  MRN-[id number]    Patient is a 89y old  Female who presents with a chief complaint of abd pain (01 Aug 2022 12:52)      Review of System    General:	Denies fatigue, fevers, chills, sweats, decreased appetite.    Skin/Breast: denies pruritis, rash  	  Ophthalmologic: no change in vision or blurring  	  HEENT	Denies dry mouth, oral sores, dysphagia,  change in hearing.    Respiratory and Thorax: no cough, sob, wheeze, hemoptysis  	  Cardiovascular:	no cp , palp, orthopnea    Gastrointestinal:	no n/v/d constipation    Genitourinary:	no dysuria of frequency, no hematuria, no flank pain    Musculoskeletal:	no bone or joint pain. no muscle aches.     Neurological:	no change in sensory or motor function. no headache. no weakness.     Psychiatric:	no depression, no anxiety, insomnia.     Hematology/Lymphatics:	no bleeding or bruising        Current Meds  MEDICATIONS  (STANDING):  chlorhexidine 2% Cloths 1 Application(s) Topical daily  enalapril 2.5 milliGRAM(s) Oral daily  heparin   Injectable 5000 Unit(s) SubCutaneous every 8 hours  sodium chloride 0.9%. 500 milliLiter(s) (30 mL/Hr) IV Continuous <Continuous>    MEDICATIONS  (PRN):  acetaminophen     Tablet .. 650 milliGRAM(s) Oral every 6 hours PRN Temp greater or equal to 38C (100.4F), Mild Pain (1 - 3)  aluminum hydroxide/magnesium hydroxide/simethicone Suspension 30 milliLiter(s) Oral every 4 hours PRN Dyspepsia  benzocaine 15 mG/menthol 3.6 mG Lozenge 1 Lozenge Oral every 3 hours PRN Sore Throat  guaifenesin/dextromethorphan Oral Liquid 10 milliLiter(s) Oral every 6 hours PRN Cough  melatonin 3 milliGRAM(s) Oral at bedtime PRN Insomnia  ondansetron Injectable 4 milliGRAM(s) IV Push every 8 hours PRN Nausea and/or Vomiting      Vitals  Vital Signs Last 24 Hrs  T(C): 36.4 (04 Aug 2022 00:15), Max: 36.8 (03 Aug 2022 21:26)  T(F): 97.5 (04 Aug 2022 00:15), Max: 98.2 (03 Aug 2022 21:26)  HR: 71 (04 Aug 2022 00:15) (63 - 75)  BP: 143/63 (04 Aug 2022 00:15) (111/57 - 148/61)  BP(mean): --  RR: 18 (04 Aug 2022 00:15) (18 - 18)  SpO2: 96% (04 Aug 2022 00:15) (96% - 98%)    Parameters below as of 04 Aug 2022 00:15  Patient On (Oxygen Delivery Method): room air        PHYSICAL EXAM:    Constitutional: NAD    Eyes: PERRLA EOMI, anicteric sclera    Heent :No oral sores, no pharyngeal injection. moist mucosa.    Neck: supple, no jvd, no LAD    Respiratory: CTA b/l     Cardiovascular: s1s2, no m/g/r    Gastrointestinal: soft, nt, nd, + BS    Extremities: no c/c/e    Neurological:A&O x 3 moves all ext.    Skin: no rash on exposed skin    Lymph Nodes: no lymphadenopathy.      Lab  CBC Full  -  ( 03 Aug 2022 06:58 )  WBC Count : 16.49 K/uL  RBC Count : 3.43 M/uL  Hemoglobin : 10.9 g/dL  Hematocrit : 33.2 %  Platelet Count - Automated : 168 K/uL  Mean Cell Volume : 96.8 fl  Mean Cell Hemoglobin : 31.8 pg  Mean Cell Hemoglobin Concentration : 32.8 gm/dL  Auto Neutrophil # : 13.24 K/uL  Auto Lymphocyte # : 2.15 K/uL  Auto Monocyte # : 0.99 K/uL  Auto Eosinophil # : 0.01 K/uL  Auto Basophil # : 0.02 K/uL  Auto Neutrophil % : 80.3 %  Auto Lymphocyte % : 13.0 %  Auto Monocyte % : 6.0 %  Auto Eosinophil % : 0.1 %  Auto Basophil % : 0.1 %    08-03    133<L>  |  100  |  30<H>  ----------------------------<  124<H>  3.8   |  20<L>  |  1.14    Ca    8.5      03 Aug 2022 07:03    TPro  6.1  /  Alb  3.4  /  TBili  1.3<H>  /  DBili  x   /  AST  24  /  ALT  9<L>  /  AlkPhos  81  08-03        Rad:    Assessment/Plan

## 2022-08-04 NOTE — DISCHARGE NOTE PROVIDER - HOSPITAL COURSE
89 yo Female with past medical history of anal cancer in 2018 post Chemo and RT, chronic leg swelling, presenting with abdominal discomfort and a lump under her left breast for the last 40 years. Over the last 2 weeks the lump has been growing. Painful / uncomfortable at night.  She also c/o of abdominal pain and on and off diarrhea with loss of appetite over the last few days.  Patient also has left lower extremity edema that she states has been present for the last 2 years and has been evaluated by her Physician. She states she takes no medications. A CT scan of her abdomen/pelvis showed a pancreatic tail mass with Hepatic mets. Heme/Onc was consulted.         Pancreatic mass. : likely pancreatic cancer with mets. CEA elevated , but CA 19-9 was negative ??  Patient had an EUS performed with a FNA biopsy on 8/2/22 by Gastroenterology. Pathology c/w adenocarcinoma. Heme/Onc followed the patient while she was admitted. She will possibly need a liver biopsy as well.  Can consider outpt PET scan to further evaluate EOD, Resectable? Consider Surg-onc eval. Chest CT sub cm lung nodules. LE doppler was negative for DVT.    Bradycardia : Cardiology was consulted. no beta blockers secondary to bradycardia. She had a negative venous doppler. Cardiology decreased her enalapril to 2.5 mg daily. The pt will need further cardiac testing if she requires surgery    Non-infectious diarrhea :  unclear etiology of diarrhea. This does not appear to be significant based on history and now appears improved . diet as tolerated >> encourage PO intake given hyponatremia     Lower extremity edema. >> improved but per report is chronic. Duplex was negative for DVT. Monitor protein intake and continue leg elevation     Leukocytosis post procedure : likely reactive but given episode of seating and low BP last night : monitor closely for now  if febrile >> blood cultures >> resolved     Hyponatremia: Resolved on am labs         Medically stable for hospital discharge on 8/4/22 with close Heme/Onc follow up

## 2022-08-04 NOTE — PROGRESS NOTE ADULT - PROVIDER SPECIALTY LIST ADULT
Cardiology
Internal Medicine
Internal Medicine
Heme/Onc
Heme/Onc
Internal Medicine
Internal Medicine
Gastroenterology
Heme/Onc
Heme/Onc

## 2022-08-04 NOTE — DISCHARGE NOTE PROVIDER - CARE PROVIDER_API CALL
Berto العراقي  HEMATOLOGY  [address] [facility], [address]  [place] [address]  Phone: [phone]  Fax: [phone]  Follow Up Time: 2 weeks    John Drew  CARDIOVASCULAR DISEASE  [address] [place] [address], [address]  [place] [address]  Phone: [phone]  Fax: [phone]  Follow Up Time: 1 month    DEMOND MENDOZA  Internal Medicine  [id number] [place] [address], [address] [address]  Phone: [phone]  Fax: ([phone]  Established Patient  Follow Up Time: 2 weeks

## 2022-08-04 NOTE — PROGRESS NOTE ADULT - ASSESSMENT
pancreatic mass with possible liver metastases.   s/p EUS with bx of pancreatic mass. Pathology c/w adenocarcinoma  Will possibly need a liver biopsy as well.   Can consider outpt PET scan to further evaluate EOD, Resectable?  Consider Surg-onc eval.    Chest CT sub cm lung nodules.   LE doppler negative for DVT. pancreatic mass with possible liver metastases.   s/p EUS with bx of pancreatic mass. Pathology c/w adenocarcinoma  Will possibly need a liver biopsy as well.   Can consider outpt PET scan to further evaluate EOD, Resectable?  Consider Surg-onc eval.    Chest CT sub cm lung nodules.   LE doppler negative for DVT.    elevated WBC- New- reactive, but unclear source.  pancreatic mass with possible liver metastases.   s/p EUS with bx of pancreatic mass. Pathology c/w adenocarcinoma  Will possibly need a liver biopsy as well.   Can consider outpt PET scan to further evaluate EOD, Resectable?  Consider Surg-onc eval.    Chest CT sub cm lung nodules.   LE doppler negative for DVT.    elevated WBC- New- reactive, but unclear source.   I confirmed that pt plans to follow up with her prior oncologist in [facility]

## 2022-08-04 NOTE — DISCHARGE NOTE NURSING/CASE MANAGEMENT/SOCIAL WORK - NSDCPEFALRISK_GEN_ALL_CORE
For information on Fall & Injury Prevention, visit: [email or web address] OR  [email or web address] OR  https://www.cdc.gov/steadi/patient.html

## 2022-08-04 NOTE — DISCHARGE NOTE PROVIDER - PROVIDER TOKENS
PROVIDER:[TOKEN:[[id number]],FOLLOWUP:[2 weeks]],PROVIDER:[TOKEN:[[id number]],FOLLOWUP:[1 month]],PROVIDER:[TOKEN:[[id number]],FOLLOWUP:[2 weeks],ESTABLISHEDPATIENT:[T]]

## 2022-08-04 NOTE — DISCHARGE NOTE PROVIDER - NSDCCPTREATMENT_GEN_ALL_CORE_FT
PRINCIPAL PROCEDURE  Procedure: CT abdomen & pelvis  Findings and Treatment: Pancreatic tail mass with vascular involvement and suspected hepatic metastatic disease.  A 1.5 cm cystic focus in the anterior left lower quadrants, etiology   unclear. This may be a benign cyst, less likely peritoneal disease.

## 2022-08-04 NOTE — PROGRESS NOTE ADULT - SUBJECTIVE AND OBJECTIVE BOX
CARDIOLOGY     PROGRESS  NOTE   ________________________________________________    CHIEF COMPLAINT:Patient is a 89y old  Female who presents with a chief complaint of abd pain (01 Aug 2022 12:52)  no complain.  	  REVIEW OF SYSTEMS:  CONSTITUTIONAL: No fever, weight loss, or fatigue  EYES: No eye pain, visual disturbances, or discharge  ENT:  No difficulty hearing, tinnitus, vertigo; No sinus or throat pain  NECK: No pain or stiffness  RESPIRATORY: No cough, wheezing, chills or hemoptysis; No Shortness of Breath  CARDIOVASCULAR: No chest pain, palpitations, passing out, dizziness, or leg swelling  GASTROINTESTINAL: No abdominal or epigastric pain. No nausea, vomiting, or hematemesis; No diarrhea or constipation. No melena or hematochezia.  GENITOURINARY: No dysuria, frequency, hematuria, or incontinence  NEUROLOGICAL: No headaches, memory loss, loss of strength, numbness, or tremors  SKIN: No itching, burning, rashes, or lesions   LYMPH Nodes: No enlarged glands  ENDOCRINE: No heat or cold intolerance; No hair loss  MUSCULOSKELETAL: No joint pain or swelling; No muscle, back, or extremity pain  PSYCHIATRIC: No depression, anxiety, mood swings, or difficulty sleeping  HEME/LYMPH: No easy bruising, or bleeding gums  ALLERGY AND IMMUNOLOGIC: No hives or eczema	    [ ] All others negative	  [ ] Unable to obtain    PHYSICAL EXAM:  T(C): 36.4 (08-04-22 @ 00:15), Max: 36.8 (08-03-22 @ 21:26)  HR: 71 (08-04-22 @ 00:15) (63 - 71)  BP: 143/63 (08-04-22 @ 00:15) (138/62 - 148/61)  RR: 18 (08-04-22 @ 00:15) (18 - 18)  SpO2: 96% (08-04-22 @ 00:15) (96% - 98%)  Wt(kg): --  I&O's Summary    03 Aug 2022 07:01  -  04 Aug 2022 07:00  --------------------------------------------------------  IN: 250 mL / OUT: 0 mL / NET: 250 mL        Appearance: Normal	  HEENT:   Normal oral mucosa, PERRL, EOMI	  Lymphatic: No lymphadenopathy  Cardiovascular: Normal S1 S2, No JVD, +murmurs, No edema  Respiratory: Lungs clear to auscultation	  Psychiatry: A & O x 3, Mood & affect appropriate  Gastrointestinal:  Soft, Non-tender, + BS	  Skin: No rashes, No ecchymoses, No cyanosis	  Neurologic: Non-focal  Extremities: Normal range of motion, No clubbing, cyanosis or edema  Vascular: Peripheral pulses palpable 2+ bilaterally    MEDICATIONS  (STANDING):  chlorhexidine 2% Cloths 1 Application(s) Topical daily  enalapril 2.5 milliGRAM(s) Oral daily  heparin   Injectable 5000 Unit(s) SubCutaneous every 8 hours  sodium chloride 0.9%. 500 milliLiter(s) (30 mL/Hr) IV Continuous <Continuous>      TELEMETRY: 	    ECG:  	  RADIOLOGY:  OTHER: 	  	  LABS:	 	    CARDIAC MARKERS:                                10.6   8.75  )-----------( 147      ( 04 Aug 2022 06:47 )             32.7     08-04    139  |  103  |  32<H>  ----------------------------<  118<H>  3.9   |  23  |  1.05    Ca    9.0      04 Aug 2022 06:46  Phos  2.4     08-04  Mg     2.0     08-04    TPro  6.3  /  Alb  3.3  /  TBili  0.6  /  DBili  x   /  AST  23  /  ALT  14  /  AlkPhos  85  08-04    proBNP: Serum Pro-Brain Natriuretic Peptide: 247 pg/mL (07-31 @ 13:46)    Lipid Profile: Cholesterol 216  LDL --  HDL 76  TG 63    HgA1c:   TSH:         Assessment and plan  ---------------------------  89 year old female with a PMHx of anal cancer now resolved and no other stated medical history presenting with multiple medical problems. Patient states that she has had a bump under her left breast for the last 40 years. Over the last 2 weeks the lump has been growing. Painful at night. Associated diarrhea and loss of appetite over the last few days. Denies fevers, chest pain, difficulty breathing, abdominal pain, nausea, vomiting, changes in vision, headaches. Patient also has left lower extremity edema that she states has been present for the last 2 years and has been evaluated by her physician. She states she takes no medications.  ct scan abdomen and pelvis noted with metastatic pancreatic CA  no beta blocker sec to bradycardia  suspect possible dvt, check venous doppler  palliative care  no need for any cardiac testing  onc/ gi eval  negative venous doppler  dvt prophylaxis  decrease enalapril to 2.5 mg daily  pt will need further cardiac testing if she requires surgery

## 2022-08-04 NOTE — DISCHARGE NOTE PROVIDER - NSDCCAREPROVSEEN_GEN_ALL_CORE_FT
Jazmine, Rito العراقي, John Rashid Rito Lucero  [place] Rose  Ordering Physician  [facility] Medicine  Team [facility] Advanced Endoscopy  Team [facility] Interventional Radiology      [ Greater than 35 min spent for discharge services.   LINA Lucero ]

## 2022-08-22 ENCOUNTER — NON-APPOINTMENT (OUTPATIENT)
Age: 87
End: 2022-08-22

## 2023-06-12 NOTE — PATIENT PROFILE ADULT - FUNCTIONAL ASSESSMENT - BASIC MOBILITY 5.
CHIEF COMPLAINT  Chief Complaint   Patient presents with    Eye Problem       HPI  Luke Silva a 45 y.o. male who presents with c/o right eye pruritis, redness with drainage since this morning. Pt reports symptoms have become progressively worse throughout the day. Pt reports his children has conjunctivitis two weeks ago. Pt denies blurred vision, headache, chest pain, sob, abdominal pain, n/v/d, fever, rash, congestion or runny nose.       CURRENT MEDICATIONS  Current Outpatient Medications on File Prior to Visit   Medication Sig Dispense Refill    amLODIPine (NORVASC) 5 MG tablet Take 1 tablet (5 mg total) by mouth once daily. 90 tablet 1    hydroCHLOROthiazide (HYDRODIURIL) 12.5 MG Tab Take 1 tablet (12.5 mg total) by mouth once daily. 90 tablet 1    losartan (COZAAR) 100 MG tablet Take 1 tablet (100 mg total) by mouth once daily. 90 tablet 1     No current facility-administered medications on file prior to visit.       ALLERGIES  Review of patient's allergies indicates:   Allergen Reactions    Tetanus vaccines and toxoid          There is no immunization history on file for this patient.    PAST MEDICAL HISTORY  Past Medical History:   Diagnosis Date    Hypertension        SURGICAL HISTORY  No past surgical history on file.    SOCIAL HISTORY  Social History     Socioeconomic History    Marital status:    Tobacco Use    Smoking status: Never    Smokeless tobacco: Current     Types: Chew   Substance and Sexual Activity    Alcohol use: Yes     Comment: socially       FAMILY HISTORY  No family history on file.    REVIEW OF SYSTEMS  Constitutional: No fever, chills, or weakness.  Eyes: +right eye redness, burning, and discharge  HENT: No ear pain, no headache, no rhinorrhea, no throat pain  Respiratory: No cough, wheezing or shortness of breath  Cardiovascular: No chest pain, palpitations or edema  All systems otherwise negative except as noted in the Review of Systems and History of Present  Illness      PHYSICAL EXAM  Reviewed Triage Note  VITAL SIGNS: 125/82  Constitutional: Well developed, well nourished, Alert and oriented x3, No acute distress, non-toxic appearance.  HENT: Normocephalic, Atraumatic, Bilateral external ears normal, external nose negative, oropharynx moist, No oral exudates.  Eyes: PERRL, EOMI, right Conjunctiva injected with yellow discharge, no periorbital edema, right ear clear  Neck: Normal range of motion, no tenderness, supple, no carotid bruits  Respiratory: Normal breath sounds, no respiratory distress, no wheezing, no rhonchi, no rales  Cardiovascular:  HR 80, normal rhythm, no murmurs, no rubs, no gallops.      LABS  Pertinent labs reviewed. (see chart for details)      RADIOLOGY  No orders to display         PROCEDURE  Procedures      ED COURSE & MEDICAL DECISION MAKING    Physical exam findings discussed with patient. No acute emergent medical condition identified at this time to warrant further testing. Will dispo home with instructions to follow up with PCP tomorrow. Script for erythromycin given to patient with instructions on usage. Pt agrees with plan of care.     DISPOSITION  Patient discharged in stable condition    CLINICAL IMPRESSION:  The encounter diagnosis was Conjunctivitis, unspecified conjunctivitis type, unspecified laterality.    Patient advised to follow-up with your PCP within 3 days for BP re-check if Blood Pressure was >120/80 without history of hypertension.         4 = No assist / stand by assistance

## 2023-09-25 NOTE — PATIENT PROFILE ADULT - FUNCTIONAL ASSESSMENT - BASIC MOBILITY 2.
Case Management Assessment & Discharge Planning Note    Patient name Sydell Cogan  Location [address] [facility] 320/-01 MRN [id number]  : 1958 Date 2023       Current Admission Date: 2023  Current Admission Diagnosis:Abdominal pain   Patient Active Problem List    Diagnosis Date Noted   • Abnormal carotid pulse 2023   • Hypokalemia 2023   • Abdominal pain 2023   • Primary hypertension 2023   • Left foot drop 2023   • Hyperlipidemia 2022   • Aortic valve disease 2018      LOS (days): 0  Geometric Mean LOS (GMLOS) (days):   Days to GMLOS:     OBJECTIVE:              Current admission status: Observation       Preferred Pharmacy:    [phone] [id number])  [address] [address]  Phone: [phone] Fax: [phone]    Primary Care Provider: Karlos Nogueira MD    Primary Insurance: BLUE CROSS  Secondary Insurance:     ASSESSMENT:  [facility] Proxies    There are no active Health Care Proxies on file. Advance Directives  Does patient have a [address]?: No  Was patient offered paperwork?: Yes (declined)  Does patient currently have a Health Care decision maker?: Yes, please see Health Care Proxy section  Does patient have Advance Directives?: No  Was patient offered paperwork?: Yes (declined)  Primary Contact: Peter         Readmission Root Cause  30 Day Readmission: No    Patient Information  Admitted from[de-identified] Home  Mental Status: Alert  During Assessment patient was accompanied by: Not accompanied during assessment  Assessment information provided by[de-identified] Patient  Primary Caregiver: Self  Support Systems: Self, Spouse/significant other  [place] of Residence: [address] [place] [address] do you live in?: [address] entry access options.  Select all that apply.: Stairs  Number of steps to enter home.: 8  Do the steps have railings?: Yes  Type of Current Residence: [address] [place] home  Upon entering residence, is there a bedroom on the main floor (no further steps)?: No  A bedroom is located on the following floor levels of residence (select all that apply):: 2nd Floor  Number of steps to 2nd floor from main floor: One Flight  In the last 12 months, was there a time when you were not able to pay the mortgage or rent on time?: No  In the last 12 months, how many places have you lived?: 1  In the last 12 months, was there a time when you did not have a steady place to sleep or slept in a shelter (including now)?: No  Homeless/housing insecurity resource given?: N/A  Living Arrangements: Lives w/ Spouse/significant other  Is patient a ?: No    Activities of Daily Living Prior to Admission  Functional Status: Independent  Completes ADLs independently?: Yes  Ambulates independently?: Yes  Does patient use assisted devices?: No  Does patient currently own DME?: No  Does patient have a history of Outpatient Therapy (PT/OT)?: No  Does the patient have a history of Short-Term Rehab?: No  Does patient have a history of HHC?: No  Does patient currently have [address]?: No         Patient Information Continued  Does patient have prescription coverage?: Yes  Within the past 12 months, you worried that your food would run out before you got the money to buy more.: Never true  Within the past 12 months, the food you bought just didn't last and you didn't have money to get more.: Never true  Food insecurity resource given?: N/A  Does patient receive dialysis treatments?: No  Does patient have a history of substance abuse?: No  Does patient have a history of Mental Health Diagnosis?: No    PHQ 2/9 Screening   Reviewed PHQ 2/9 Depression Screening Score?: No    Means of Transportation  Means of Transport to Appts[de-identified] Drives Self  In the past 12 months, has lack of transportation kept you from medical appointments or from getting medications?: No  In the past 12 months, has lack of transportation kept you from meetings, work, or from getting things needed for daily living?: No  Was application for public transport provided?: N/A        DISCHARGE DETAILS:    Discharge planning discussed with[de-identified] Patient at bedside  Freedom of Choice: Yes  Comments - Freedom of Choice: CM discussed freedom of choice as it pertains to discharge planning. Patient denied having any needs.   CM contacted family/caregiver?: No- see comments  Were Treatment Team discharge recommendations reviewed with patient/caregiver?: Yes  Did patient/caregiver verbalize understanding of patient care needs?: Yes  Were patient/caregiver advised of the risks associated with not following Treatment Team discharge recommendations?: Yes         Requested [facility]         Is the patient interested in [facility] at discharge?: No    DME Referral Provided  Referral made for DME?: No    Other Referral/Resources/Interventions Provided:  Interventions: None Indicated    Would you like to participate in our [address] service program?  : No - Declined    Treatment Team Recommendation: Home  Discharge Destination Plan[de-identified] Home  Transport at Discharge : Family 4 = No assist / stand by assistance

## 2024-09-25 NOTE — ED ADULT TRIAGE NOTE - AS HEIGHT TYPE
Future Appointments   Date Time Provider Department Center   10/2/2024  4:30 PM [facility]        stated

## (undated) DEVICE — TUBING SUCTION CONN 6FT STERILE

## (undated) DEVICE — SENSOR O2 FINGER ADULT

## (undated) DEVICE — SUCTION YANKAUER NO CONTROL VENT

## (undated) DEVICE — BALLOON US ENDO

## (undated) DEVICE — CATH IV SAFE BC 22G X 1" (BLUE)

## (undated) DEVICE — BITE BLOCK ADULT 20 X 27MM (GREEN)

## (undated) DEVICE — FOLEY HOLDER STATLOCK 2 WAY ADULT

## (undated) DEVICE — SOL INJ NS 0.9% 500ML 2 PORT

## (undated) DEVICE — TUBING SUCTION 20FT

## (undated) DEVICE — PACK IV START WITH CHG

## (undated) DEVICE — CATH IV SAFE BC 20G X 1.16" (PINK)

## (undated) DEVICE — NDL ACQUIRE EUS FNB 22G

## (undated) DEVICE — SYR ALLIANCE II INFLATION 60ML

## (undated) DEVICE — TUBING IV SET GRAVITY 3Y 100" MACRO